# Patient Record
Sex: FEMALE | Race: ASIAN | NOT HISPANIC OR LATINO
[De-identification: names, ages, dates, MRNs, and addresses within clinical notes are randomized per-mention and may not be internally consistent; named-entity substitution may affect disease eponyms.]

---

## 2018-10-12 ENCOUNTER — TRANSCRIPTION ENCOUNTER (OUTPATIENT)
Age: 23
End: 2018-10-12

## 2019-01-18 ENCOUNTER — TRANSCRIPTION ENCOUNTER (OUTPATIENT)
Age: 24
End: 2019-01-18

## 2019-11-11 ENCOUNTER — EMERGENCY (EMERGENCY)
Facility: HOSPITAL | Age: 24
LOS: 1 days | Discharge: HOME | End: 2019-11-11
Attending: EMERGENCY MEDICINE
Payer: COMMERCIAL

## 2019-11-11 VITALS
TEMPERATURE: 98 F | SYSTOLIC BLOOD PRESSURE: 131 MMHG | HEIGHT: 66 IN | OXYGEN SATURATION: 100 % | HEART RATE: 105 BPM | WEIGHT: 139.99 LBS | DIASTOLIC BLOOD PRESSURE: 88 MMHG | RESPIRATION RATE: 16 BRPM

## 2019-11-11 PROCEDURE — 99053 MED SERV 10PM-8AM 24 HR FAC: CPT

## 2019-11-11 PROCEDURE — 99283 EMERGENCY DEPT VISIT LOW MDM: CPT | Mod: 25

## 2019-11-11 PROCEDURE — 73110 X-RAY EXAM OF WRIST: CPT | Mod: 26,RT

## 2019-11-11 PROCEDURE — 73110 X-RAY EXAM OF WRIST: CPT

## 2019-11-11 PROCEDURE — 99283 EMERGENCY DEPT VISIT LOW MDM: CPT

## 2019-11-11 RX ORDER — IBUPROFEN 200 MG
800 TABLET ORAL ONCE
Refills: 0 | Status: COMPLETED | OUTPATIENT
Start: 2019-11-11 | End: 2019-11-11

## 2019-11-11 RX ADMIN — Medication 800 MILLIGRAM(S): at 04:52
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
## 2019-11-11 NOTE — ED PROVIDER NOTE - PATIENT PORTAL LINK FT
You can access the FollowMyHealth Patient Portal offered by [facility] by registering at the following website: [email or web address]. By joining [employer]’s FollowMyHealth portal, you will also be able to view your health information using other applications (apps) compatible with our system.
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
## 2019-11-11 NOTE — ED PROVIDER NOTE - MUSCULOSKELETAL MINIMAL EXAM
Swelling of the R wrist, range of motion intact. No bony tenderness. Range of motion of hand intact. No anatomical snuff box tenderness. Peripheral pulses intact.

## 2019-12-09 ENCOUNTER — EMERGENCY (EMERGENCY)
Facility: HOSPITAL | Age: 24
LOS: 1 days | Discharge: HOME | End: 2019-12-09
Attending: EMERGENCY MEDICINE | Admitting: EMERGENCY MEDICINE
Payer: COMMERCIAL

## 2019-12-09 VITALS
SYSTOLIC BLOOD PRESSURE: 105 MMHG | RESPIRATION RATE: 16 BRPM | OXYGEN SATURATION: 99 % | HEART RATE: 85 BPM | DIASTOLIC BLOOD PRESSURE: 72 MMHG

## 2019-12-09 VITALS
OXYGEN SATURATION: 100 % | HEIGHT: 66 IN | TEMPERATURE: 100 F | HEART RATE: 127 BPM | SYSTOLIC BLOOD PRESSURE: 108 MMHG | WEIGHT: 139.99 LBS | RESPIRATION RATE: 20 BRPM | DIASTOLIC BLOOD PRESSURE: 69 MMHG

## 2019-12-09 LAB
ALBUMIN SERPL ELPH-MCNC: 3.7 G/DL — SIGNIFICANT CHANGE UP (ref 3.3–5)
ALP SERPL-CCNC: 46 U/L — SIGNIFICANT CHANGE UP (ref 40–120)
ALT FLD-CCNC: 16 U/L — SIGNIFICANT CHANGE UP (ref 12–78)
ANION GAP SERPL CALC-SCNC: 8 MMOL/L — SIGNIFICANT CHANGE UP (ref 5–17)
APTT BLD: 30.8 SEC — SIGNIFICANT CHANGE UP (ref 28.5–37)
AST SERPL-CCNC: 18 U/L — SIGNIFICANT CHANGE UP (ref 15–37)
BASOPHILS # BLD AUTO: 0.01 K/UL — SIGNIFICANT CHANGE UP (ref 0–0.2)
BASOPHILS NFR BLD AUTO: 0.2 % — SIGNIFICANT CHANGE UP (ref 0–2)
BILIRUB SERPL-MCNC: 0.6 MG/DL — SIGNIFICANT CHANGE UP (ref 0.2–1.2)
BUN SERPL-MCNC: 11 MG/DL — SIGNIFICANT CHANGE UP (ref 7–23)
CALCIUM SERPL-MCNC: 9 MG/DL — SIGNIFICANT CHANGE UP (ref 8.5–10.1)
CHLORIDE SERPL-SCNC: 108 MMOL/L — SIGNIFICANT CHANGE UP (ref 96–108)
CO2 SERPL-SCNC: 24 MMOL/L — SIGNIFICANT CHANGE UP (ref 22–31)
CREAT SERPL-MCNC: 0.82 MG/DL — SIGNIFICANT CHANGE UP (ref 0.5–1.3)
EOSINOPHIL # BLD AUTO: 0 K/UL — SIGNIFICANT CHANGE UP (ref 0–0.5)
EOSINOPHIL NFR BLD AUTO: 0 % — SIGNIFICANT CHANGE UP (ref 0–6)
GLUCOSE SERPL-MCNC: 102 MG/DL — HIGH (ref 70–99)
HCT VFR BLD CALC: 35.2 % — SIGNIFICANT CHANGE UP (ref 34.5–45)
HGB BLD-MCNC: 11.7 G/DL — SIGNIFICANT CHANGE UP (ref 11.5–15.5)
IMM GRANULOCYTES NFR BLD AUTO: 0.5 % — SIGNIFICANT CHANGE UP (ref 0–1.5)
INR BLD: 1.52 RATIO — HIGH (ref 0.88–1.16)
LACTATE SERPL-SCNC: 1.5 MMOL/L — SIGNIFICANT CHANGE UP (ref 0.7–2)
LYMPHOCYTES # BLD AUTO: 0.82 K/UL — LOW (ref 1–3.3)
LYMPHOCYTES # BLD AUTO: 14.3 % — SIGNIFICANT CHANGE UP (ref 13–44)
MCHC RBC-ENTMCNC: 26.3 PG — LOW (ref 27–34)
MCHC RBC-ENTMCNC: 33.2 GM/DL — SIGNIFICANT CHANGE UP (ref 32–36)
MCV RBC AUTO: 79.1 FL — LOW (ref 80–100)
MONOCYTES # BLD AUTO: 0.94 K/UL — HIGH (ref 0–0.9)
MONOCYTES NFR BLD AUTO: 16.4 % — HIGH (ref 2–14)
NEUTROPHILS # BLD AUTO: 3.93 K/UL — SIGNIFICANT CHANGE UP (ref 1.8–7.4)
NEUTROPHILS NFR BLD AUTO: 68.6 % — SIGNIFICANT CHANGE UP (ref 43–77)
NRBC # BLD: 0 /100 WBCS — SIGNIFICANT CHANGE UP (ref 0–0)
PLATELET # BLD AUTO: 206 K/UL — SIGNIFICANT CHANGE UP (ref 150–400)
POTASSIUM SERPL-MCNC: 3.3 MMOL/L — LOW (ref 3.5–5.3)
POTASSIUM SERPL-SCNC: 3.3 MMOL/L — LOW (ref 3.5–5.3)
PROT SERPL-MCNC: 7.9 G/DL — SIGNIFICANT CHANGE UP (ref 6–8.3)
PROTHROM AB SERPL-ACNC: 17.5 SEC — HIGH (ref 10–12.9)
RBC # BLD: 4.45 M/UL — SIGNIFICANT CHANGE UP (ref 3.8–5.2)
RBC # FLD: 13.2 % — SIGNIFICANT CHANGE UP (ref 10.3–14.5)
SODIUM SERPL-SCNC: 140 MMOL/L — SIGNIFICANT CHANGE UP (ref 135–145)
WBC # BLD: 5.73 K/UL — SIGNIFICANT CHANGE UP (ref 3.8–10.5)
WBC # FLD AUTO: 5.73 K/UL — SIGNIFICANT CHANGE UP (ref 3.8–10.5)

## 2019-12-09 PROCEDURE — 71046 X-RAY EXAM CHEST 2 VIEWS: CPT | Mod: 26

## 2019-12-09 PROCEDURE — 96374 THER/PROPH/DIAG INJ IV PUSH: CPT

## 2019-12-09 PROCEDURE — 71046 X-RAY EXAM CHEST 2 VIEWS: CPT

## 2019-12-09 PROCEDURE — 85730 THROMBOPLASTIN TIME PARTIAL: CPT

## 2019-12-09 PROCEDURE — 99284 EMERGENCY DEPT VISIT MOD MDM: CPT

## 2019-12-09 PROCEDURE — 96375 TX/PRO/DX INJ NEW DRUG ADDON: CPT

## 2019-12-09 PROCEDURE — 85610 PROTHROMBIN TIME: CPT

## 2019-12-09 PROCEDURE — 87040 BLOOD CULTURE FOR BACTERIA: CPT

## 2019-12-09 PROCEDURE — 80053 COMPREHEN METABOLIC PANEL: CPT

## 2019-12-09 PROCEDURE — 85027 COMPLETE CBC AUTOMATED: CPT

## 2019-12-09 PROCEDURE — 36415 COLL VENOUS BLD VENIPUNCTURE: CPT

## 2019-12-09 PROCEDURE — 96361 HYDRATE IV INFUSION ADD-ON: CPT

## 2019-12-09 PROCEDURE — 93010 ELECTROCARDIOGRAM REPORT: CPT

## 2019-12-09 PROCEDURE — 93005 ELECTROCARDIOGRAM TRACING: CPT

## 2019-12-09 PROCEDURE — 83605 ASSAY OF LACTIC ACID: CPT

## 2019-12-09 PROCEDURE — 99284 EMERGENCY DEPT VISIT MOD MDM: CPT | Mod: 25

## 2019-12-09 RX ORDER — KETOROLAC TROMETHAMINE 30 MG/ML
15 SYRINGE (ML) INJECTION ONCE
Refills: 0 | Status: DISCONTINUED | OUTPATIENT
Start: 2019-12-09 | End: 2019-12-09

## 2019-12-09 RX ORDER — ONDANSETRON 8 MG/1
4 TABLET, FILM COATED ORAL ONCE
Refills: 0 | Status: COMPLETED | OUTPATIENT
Start: 2019-12-09 | End: 2019-12-09

## 2019-12-09 RX ORDER — ONDANSETRON 8 MG/1
1 TABLET, FILM COATED ORAL
Qty: 15 | Refills: 0
Start: 2019-12-09 | End: 2019-12-13

## 2019-12-09 RX ORDER — SODIUM CHLORIDE 9 MG/ML
1950 INJECTION INTRAMUSCULAR; INTRAVENOUS; SUBCUTANEOUS ONCE
Refills: 0 | Status: COMPLETED | OUTPATIENT
Start: 2019-12-09 | End: 2019-12-09

## 2019-12-09 RX ORDER — ACETAMINOPHEN 500 MG
975 TABLET ORAL ONCE
Refills: 0 | Status: COMPLETED | OUTPATIENT
Start: 2019-12-09 | End: 2019-12-09

## 2019-12-09 RX ADMIN — SODIUM CHLORIDE 1950 MILLILITER(S): 9 INJECTION INTRAMUSCULAR; INTRAVENOUS; SUBCUTANEOUS at 10:12

## 2019-12-09 RX ADMIN — ONDANSETRON 4 MILLIGRAM(S): 8 TABLET, FILM COATED ORAL at 10:20

## 2019-12-09 RX ADMIN — SODIUM CHLORIDE 1950 MILLILITER(S): 9 INJECTION INTRAMUSCULAR; INTRAVENOUS; SUBCUTANEOUS at 11:12

## 2019-12-09 RX ADMIN — Medication 15 MILLIGRAM(S): at 10:20

## 2019-12-09 RX ADMIN — Medication 975 MILLIGRAM(S): at 10:18

## 2019-12-09 NOTE — ED PROVIDER NOTE - OBJECTIVE STATEMENT
pt with cough, congestion, fevers, chills for last 2-3 days, seen at urgent care yesterday and had negative strep test and neg flu test.  pt states that every few hours she spikes a fever, denies abdominal pain, denies diarrhea, denies dysuria, had a couple of episodes of vomiting.

## 2019-12-09 NOTE — ED PROVIDER NOTE - NSFOLLOWUPINSTRUCTIONS_ED_ALL_ED_FT
-- Follow up with your primary care physician in 48 hours.    -- Return to the ER for worsening or persistent symptoms, and/or ANY NEW OR CONCERNING SYMPTOMS.    -- If you have difficulty following up, please call: [phone] to obtain a [facility] doctor or specialist who takes your insurance in your area.

## 2019-12-09 NOTE — ED PROVIDER NOTE - PHYSICAL EXAMINATION
Gen:  alert, awake, no acute distress  HEENT:  atraumatic head, airway clear, pupils equal and round, nasal congestion  CV:  rrr, nl S1, S2, no m/r/g  Pulm:  lungs CTA b/l  Abd: s/nt/nd, +BS  Ext:  moving all extremities  Neuro:  grossly intact, no focal deficits  Skin:  clear, dry, intact  Psych: AOx3, normal affect, no apparent risk to self or others

## 2019-12-09 NOTE — ED PROVIDER NOTE - PATIENT PORTAL LINK FT
You can access the FollowMyHealth Patient Portal offered by [facility] by registering at the following website: [email or web address]. By joining [employer]’s FollowMyHealth portal, you will also be able to view your health information using other applications (apps) compatible with our system.

## 2019-12-14 LAB
CULTURE RESULTS: SIGNIFICANT CHANGE UP
CULTURE RESULTS: SIGNIFICANT CHANGE UP
SPECIMEN SOURCE: SIGNIFICANT CHANGE UP
SPECIMEN SOURCE: SIGNIFICANT CHANGE UP

## 2020-03-15 ENCOUNTER — TRANSCRIPTION ENCOUNTER (OUTPATIENT)
Age: 25
End: 2020-03-15

## 2020-04-20 ENCOUNTER — EMERGENCY (EMERGENCY)
Facility: HOSPITAL | Age: 25
LOS: 1 days | Discharge: HOME | End: 2020-04-20
Attending: STUDENT IN AN ORGANIZED HEALTH CARE EDUCATION/TRAINING PROGRAM
Payer: COMMERCIAL

## 2020-04-20 VITALS
DIASTOLIC BLOOD PRESSURE: 83 MMHG | SYSTOLIC BLOOD PRESSURE: 121 MMHG | TEMPERATURE: 98 F | WEIGHT: 130.07 LBS | HEART RATE: 95 BPM | RESPIRATION RATE: 16 BRPM | HEIGHT: 66 IN | OXYGEN SATURATION: 99 %

## 2020-04-20 PROCEDURE — 99283 EMERGENCY DEPT VISIT LOW MDM: CPT | Mod: 25

## 2020-04-20 RX ORDER — TETANUS TOXOID, REDUCED DIPHTHERIA TOXOID AND ACELLULAR PERTUSSIS VACCINE, ADSORBED 5; 2.5; 8; 8; 2.5 [IU]/.5ML; [IU]/.5ML; UG/.5ML; UG/.5ML; UG/.5ML
0.5 SUSPENSION INTRAMUSCULAR ONCE
Refills: 0 | Status: COMPLETED | OUTPATIENT
Start: 2020-04-20 | End: 2020-04-20

## 2020-04-20 RX ORDER — RALTEGRAVIR 400 MG/1
400 TABLET, FILM COATED ORAL ONCE
Refills: 0 | Status: COMPLETED | OUTPATIENT
Start: 2020-04-20 | End: 2020-04-20

## 2020-04-20 RX ORDER — EMTRICITABINE AND TENOFOVIR DISOPROXIL FUMARATE 200; 300 MG/1; MG/1
1 TABLET, FILM COATED ORAL ONCE
Refills: 0 | Status: COMPLETED | OUTPATIENT
Start: 2020-04-20 | End: 2020-04-20

## 2020-04-20 NOTE — ED ADULT TRIAGE NOTE - CHIEF COMPLAINT QUOTE
Pt was recapping the kineret syringe after administration and she got stuck with the needle on the left thumb

## 2020-04-20 NOTE — ED PROVIDER NOTE - PATIENT PORTAL LINK FT
You can access the FollowMyHealth Patient Portal offered by [facility] by registering at the following website: [email or web address]. By joining [employer]’s FollowMyHealth portal, you will also be able to view your health information using other applications (apps) compatible with our system.

## 2020-04-20 NOTE — ED PROVIDER NOTE - PROGRESS NOTE DETAILS
pt c/s about risk benefits of prep, understands risk is small however without hiv status of source pt I would recommend taking prep until information obtained, pt aware and a/w taking prep, will follow up with ID in 1 week, 7 day packet given

## 2020-04-20 NOTE — ED PROVIDER NOTE - OBJECTIVE STATEMENT
24f with no sig pmh p/w needle stick on r index finger. no cp/sob/n/v/d/dysuria/cough/cold. last tdap unknown.  pt cleaned and washed wound unstairs.  small needle-  kineret syringe. RN unsure of patient HIV status

## 2020-04-21 LAB
ALBUMIN SERPL ELPH-MCNC: 4 G/DL — SIGNIFICANT CHANGE UP (ref 3.5–5)
ALP SERPL-CCNC: 57 U/L — SIGNIFICANT CHANGE UP (ref 40–120)
ALT FLD-CCNC: 16 U/L DA — SIGNIFICANT CHANGE UP (ref 10–60)
ANION GAP SERPL CALC-SCNC: 8 MMOL/L — SIGNIFICANT CHANGE UP (ref 5–17)
AST SERPL-CCNC: 10 U/L — SIGNIFICANT CHANGE UP (ref 10–40)
BILIRUB SERPL-MCNC: 0.9 MG/DL — SIGNIFICANT CHANGE UP (ref 0.2–1.2)
BUN SERPL-MCNC: 11 MG/DL — SIGNIFICANT CHANGE UP (ref 7–18)
CALCIUM SERPL-MCNC: 9.1 MG/DL — SIGNIFICANT CHANGE UP (ref 8.4–10.5)
CHLORIDE SERPL-SCNC: 106 MMOL/L — SIGNIFICANT CHANGE UP (ref 96–108)
CO2 SERPL-SCNC: 24 MMOL/L — SIGNIFICANT CHANGE UP (ref 22–31)
CREAT SERPL-MCNC: 0.67 MG/DL — SIGNIFICANT CHANGE UP (ref 0.5–1.3)
GLUCOSE SERPL-MCNC: 95 MG/DL — SIGNIFICANT CHANGE UP (ref 70–99)
HAV IGM SER-ACNC: SIGNIFICANT CHANGE UP
HBV CORE IGM SER-ACNC: SIGNIFICANT CHANGE UP
HBV SURFACE AG SER-ACNC: SIGNIFICANT CHANGE UP
HCV AB S/CO SERPL IA: 0.19 S/CO — SIGNIFICANT CHANGE UP (ref 0–0.99)
HCV AB SERPL-IMP: SIGNIFICANT CHANGE UP
HIV 1+2 AB+HIV1 P24 AG SERPL QL IA: SIGNIFICANT CHANGE UP
POTASSIUM SERPL-MCNC: 4.1 MMOL/L — SIGNIFICANT CHANGE UP (ref 3.5–5.3)
POTASSIUM SERPL-SCNC: 4.1 MMOL/L — SIGNIFICANT CHANGE UP (ref 3.5–5.3)
PROT SERPL-MCNC: 8.4 G/DL — HIGH (ref 6–8.3)
SODIUM SERPL-SCNC: 138 MMOL/L — SIGNIFICANT CHANGE UP (ref 135–145)

## 2020-04-21 PROCEDURE — 86703 HIV-1/HIV-2 1 RESULT ANTBDY: CPT

## 2020-04-21 PROCEDURE — 80074 ACUTE HEPATITIS PANEL: CPT

## 2020-04-21 PROCEDURE — 99283 EMERGENCY DEPT VISIT LOW MDM: CPT | Mod: 25

## 2020-04-21 PROCEDURE — 36415 COLL VENOUS BLD VENIPUNCTURE: CPT

## 2020-04-21 PROCEDURE — 87389 HIV-1 AG W/HIV-1&-2 AB AG IA: CPT

## 2020-04-21 PROCEDURE — 80053 COMPREHEN METABOLIC PANEL: CPT

## 2020-04-21 PROCEDURE — 90715 TDAP VACCINE 7 YRS/> IM: CPT

## 2020-04-21 PROCEDURE — 90471 IMMUNIZATION ADMIN: CPT

## 2020-04-21 RX ADMIN — TETANUS TOXOID, REDUCED DIPHTHERIA TOXOID AND ACELLULAR PERTUSSIS VACCINE, ADSORBED 0.5 MILLILITER(S): 5; 2.5; 8; 8; 2.5 SUSPENSION INTRAMUSCULAR at 00:18

## 2020-04-26 ENCOUNTER — MESSAGE (OUTPATIENT)
Age: 25
End: 2020-04-26

## 2020-05-03 LAB
SARS-COV-2 IGG SERPL IA-ACNC: 2.1 AU/ML
SARS-COV-2 IGG SERPL QL IA: NEGATIVE

## 2020-05-05 ENCOUNTER — APPOINTMENT (OUTPATIENT)
Dept: DISASTER EMERGENCY | Facility: HOSPITAL | Age: 25
End: 2020-05-05

## 2020-10-12 ENCOUNTER — TRANSCRIPTION ENCOUNTER (OUTPATIENT)
Age: 25
End: 2020-10-12

## 2020-12-10 ENCOUNTER — TRANSCRIPTION ENCOUNTER (OUTPATIENT)
Age: 25
End: 2020-12-10

## 2020-12-20 ENCOUNTER — TRANSCRIPTION ENCOUNTER (OUTPATIENT)
Age: 25
End: 2020-12-20

## 2020-12-24 ENCOUNTER — OUTPATIENT (OUTPATIENT)
Dept: OUTPATIENT SERVICES | Facility: HOSPITAL | Age: 25
LOS: 1 days | End: 2020-12-24
Payer: COMMERCIAL

## 2020-12-24 DIAGNOSIS — Z00.00 ENCOUNTER FOR GENERAL ADULT MEDICAL EXAMINATION WITHOUT ABNORMAL FINDINGS: ICD-10-CM

## 2020-12-24 LAB — SARS-COV-2 RNA SPEC QL NAA+PROBE: SIGNIFICANT CHANGE UP

## 2020-12-24 PROCEDURE — 87635 SARS-COV-2 COVID-19 AMP PRB: CPT

## 2020-12-25 ENCOUNTER — TRANSCRIPTION ENCOUNTER (OUTPATIENT)
Age: 25
End: 2020-12-25

## 2021-01-17 ENCOUNTER — TRANSCRIPTION ENCOUNTER (OUTPATIENT)
Age: 26
End: 2021-01-17

## 2021-04-14 ENCOUNTER — TRANSCRIPTION ENCOUNTER (OUTPATIENT)
Age: 26
End: 2021-04-14

## 2021-04-23 PROBLEM — Z00.00 ENCOUNTER FOR PREVENTIVE HEALTH EXAMINATION: Status: ACTIVE | Noted: 2021-04-23

## 2021-05-03 ENCOUNTER — APPOINTMENT (OUTPATIENT)
Dept: OBGYN | Facility: CLINIC | Age: 26
End: 2021-05-03
Payer: COMMERCIAL

## 2021-05-03 ENCOUNTER — NON-APPOINTMENT (OUTPATIENT)
Age: 26
End: 2021-05-03

## 2021-05-03 PROCEDURE — 99395 PREV VISIT EST AGE 18-39: CPT

## 2021-05-03 PROCEDURE — 99072 ADDL SUPL MATRL&STAF TM PHE: CPT

## 2021-10-25 ENCOUNTER — TRANSCRIPTION ENCOUNTER (OUTPATIENT)
Age: 26
End: 2021-10-25

## 2021-11-05 ENCOUNTER — APPOINTMENT (OUTPATIENT)
Dept: OBGYN | Facility: CLINIC | Age: 26
End: 2021-11-05
Payer: COMMERCIAL

## 2021-11-05 VITALS — WEIGHT: 161 LBS | DIASTOLIC BLOOD PRESSURE: 74 MMHG | SYSTOLIC BLOOD PRESSURE: 128 MMHG

## 2021-11-05 DIAGNOSIS — L73.1 PSEUDOFOLLICULITIS BARBAE: ICD-10-CM

## 2021-11-05 DIAGNOSIS — Z78.9 OTHER SPECIFIED HEALTH STATUS: ICD-10-CM

## 2021-11-05 PROCEDURE — 99213 OFFICE O/P EST LOW 20 MIN: CPT

## 2021-11-05 RX ORDER — FLUCONAZOLE 150 MG/1
150 TABLET ORAL
Qty: 2 | Refills: 3 | Status: ACTIVE | COMMUNITY
Start: 2021-11-05 | End: 1900-01-01

## 2021-11-05 RX ORDER — CLINDAMYCIN HYDROCHLORIDE 300 MG/1
300 CAPSULE ORAL EVERY 6 HOURS
Qty: 28 | Refills: 0 | Status: ACTIVE | COMMUNITY
Start: 2021-11-05 | End: 1900-01-01

## 2021-11-05 RX ORDER — CHLORHEXIDINE GLUCONATE 213 G/1000ML
4 SOLUTION TOPICAL
Qty: 1 | Refills: 0 | Status: ACTIVE | COMMUNITY
Start: 2021-11-05 | End: 1900-01-01

## 2021-11-07 ENCOUNTER — TRANSCRIPTION ENCOUNTER (OUTPATIENT)
Age: 26
End: 2021-11-07

## 2021-11-08 NOTE — PHYSICAL EXAM
[Chaperone Present] : A chaperone was present in the examining room during all aspects of the physical examination [Appropriately responsive] : appropriately responsive [Alert] : alert [No Acute Distress] : no acute distress [Soft] : soft [Non-tender] : non-tender [Non-distended] : non-distended [No HSM] : No HSM [No Lesions] : no lesions [No Mass] : no mass [Oriented x3] : oriented x3 [Enlarged] : enlarged [Normal] : normal [FreeTextEntry1] : Rossi [FreeTextEntry2] : pt with ingrown hair above clitoris. Are of inflammation and tenderness noted

## 2021-11-08 NOTE — HISTORY OF PRESENT ILLNESS
[Currently Active] : currently active [Men] : men [No] : No [FreeTextEntry1] : 25 yo female presents today c/o an ingrown hair x 3 weeks.Pt was seen by a dermatologist a few weeks ago who prescribed her doxycycline and gave her a steroid injection. Pt feels ingrown has not improved much. [FreeTextEntry3] : none

## 2021-11-08 NOTE — PLAN
[FreeTextEntry1] : 35 yo female presents today c/o ingrown hair:\par -RX sent for clindamycin 300mg and hibiclens to clean the area \par - Advised patient to apply warm compresses to the area\par -Pt traveling to [facility] on Monday for two weeks, pt concerned about getting a yeast infection from all the antibiotics she has been on. RX sent to pharmacy for diflucan.\par -F/u PRN

## 2022-01-03 ENCOUNTER — OUTPATIENT (OUTPATIENT)
Dept: OUTPATIENT SERVICES | Facility: HOSPITAL | Age: 27
LOS: 1 days | End: 2022-01-03
Payer: COMMERCIAL

## 2022-01-03 DIAGNOSIS — Z20.828 CONTACT WITH AND (SUSPECTED) EXPOSURE TO OTHER VIRAL COMMUNICABLE DISEASES: ICD-10-CM

## 2022-01-03 PROCEDURE — U0005: CPT

## 2022-01-03 PROCEDURE — U0003: CPT

## 2022-01-11 ENCOUNTER — OUTPATIENT (OUTPATIENT)
Dept: OUTPATIENT SERVICES | Facility: HOSPITAL | Age: 27
LOS: 1 days | End: 2022-01-11
Payer: COMMERCIAL

## 2022-01-11 DIAGNOSIS — Z00.00 ENCOUNTER FOR GENERAL ADULT MEDICAL EXAMINATION WITHOUT ABNORMAL FINDINGS: ICD-10-CM

## 2022-01-11 LAB — SARS-COV-2 RNA SPEC QL NAA+PROBE: DETECTED

## 2022-01-11 PROCEDURE — 87635 SARS-COV-2 COVID-19 AMP PRB: CPT

## 2022-03-14 NOTE — ED PROVIDER NOTE - PSYCHIATRIC, MLM
Alert and oriented to person, place, time/situation. normal mood and affect. no apparent risk to self or others. I will SWITCH the dose or number of times a day I take the medications listed below when I get home from the hospital:  None Adult

## 2022-03-21 ENCOUNTER — APPOINTMENT (OUTPATIENT)
Dept: OBGYN | Facility: CLINIC | Age: 27
End: 2022-03-21

## 2022-05-31 ENCOUNTER — NON-APPOINTMENT (OUTPATIENT)
Age: 27
End: 2022-05-31

## 2022-06-05 ENCOUNTER — NON-APPOINTMENT (OUTPATIENT)
Age: 27
End: 2022-06-05

## 2022-06-12 ENCOUNTER — EMERGENCY (EMERGENCY)
Facility: HOSPITAL | Age: 27
LOS: 1 days | Discharge: HOME | End: 2022-06-12
Attending: EMERGENCY MEDICINE
Payer: COMMERCIAL

## 2022-06-12 VITALS
DIASTOLIC BLOOD PRESSURE: 82 MMHG | HEIGHT: 66 IN | TEMPERATURE: 97 F | SYSTOLIC BLOOD PRESSURE: 129 MMHG | OXYGEN SATURATION: 98 % | WEIGHT: 160.06 LBS | RESPIRATION RATE: 16 BRPM | HEART RATE: 87 BPM

## 2022-06-12 LAB — HCG UR QL: NEGATIVE — SIGNIFICANT CHANGE UP

## 2022-06-12 PROCEDURE — 73070 X-RAY EXAM OF ELBOW: CPT | Mod: 26,RT

## 2022-06-12 PROCEDURE — 99283 EMERGENCY DEPT VISIT LOW MDM: CPT

## 2022-06-12 PROCEDURE — 99283 EMERGENCY DEPT VISIT LOW MDM: CPT | Mod: 25

## 2022-06-12 PROCEDURE — 73070 X-RAY EXAM OF ELBOW: CPT

## 2022-06-12 PROCEDURE — 81025 URINE PREGNANCY TEST: CPT

## 2022-06-12 RX ORDER — IBUPROFEN 200 MG
600 TABLET ORAL ONCE
Refills: 0 | Status: COMPLETED | OUTPATIENT
Start: 2022-06-12 | End: 2022-06-12

## 2022-06-12 RX ORDER — ACETAMINOPHEN 500 MG
650 TABLET ORAL ONCE
Refills: 0 | Status: COMPLETED | OUTPATIENT
Start: 2022-06-12 | End: 2022-06-12

## 2022-06-12 RX ORDER — IBUPROFEN 200 MG
1 TABLET ORAL
Qty: 30 | Refills: 0
Start: 2022-06-12

## 2022-06-12 RX ADMIN — Medication 650 MILLIGRAM(S): at 14:00

## 2022-06-12 RX ADMIN — Medication 600 MILLIGRAM(S): at 15:15

## 2022-06-12 RX ADMIN — Medication 650 MILLIGRAM(S): at 14:30

## 2022-06-12 RX ADMIN — Medication 600 MILLIGRAM(S): at 14:45

## 2022-06-12 NOTE — ED PROVIDER NOTE - PATIENT PORTAL LINK FT
You can access the FollowMyHealth Patient Portal offered by [facility] by registering at the following website: [email or web address]. By joining [employer]’s FollowMyHealth portal, you will also be able to view your health information using other applications (apps) compatible with our system.

## 2022-06-12 NOTE — ED PROVIDER NOTE - NSFOLLOWUPINSTRUCTIONS_ED_ALL_ED_FT
Elbow Sprain    Front view of the elbow, with a close-up of the elbow joint and ligaments.   An elbow sprain is an injury to one of the strong bands of tissue (ligaments) that connect the bones of the elbow. Ligaments connect the three bones that make up the elbow joint. This injury usually occurs on the inside of the elbow and rarely on the outside. There are three types of elbow sprains:  •A grade 1 sprain is a stretching of a ligament. This injury causes minor pain and swelling, but the joint remains stable.      •A grade 2 sprain is a partial ligament tear. This injury may cause moderate pain and swelling, and a looseness in the joint that causes it to move more than normal (laxity).      •A grade 3 sprain is a complete ligament tear. This injury will cause severe pain and swelling, and the joint will become unstable.        What are the causes?    This condition may be caused by:  •A sudden injury (acute sprain). This may result from falling on an outstretched arm or severely twisting or straining your elbow joint.      •An overuse injury. This injury occurs gradually over time from doing activities that involve the same elbow movements over and over again. This type of injury is common in activities that require overhand throwing.        What are the signs or symptoms?    Symptoms of this condition include:  •Pain.      •Pain that gets worse with elbow movement.      •Swelling.      •Bruising.      •Stiffness of the elbow joint.      •Laxity of the elbow joint.      •Inability to use the elbow joint.      •Tingling or numbness.      •Hearing a pop or feeling a tear at the time of the injury.        How is this diagnosed?    This condition may be diagnosed based on:  •Your symptoms and history of an injury or activity that puts stress on the elbow.      •A physical exam. The health care provider will check the movement and stability of your elbow.      •Imaging tests, such as an X-ray or MRI. These tests can show how severe the sprain is and can be used to rule out a broken bone or stress fracture.        How is this treated?    At first, this condition may be treated by protecting, resting, icing, applying pressure (compression), and raising (elevating) the injured elbow above the level of your heart. This is known as PRICE therapy.    Additional treatment depends on the severity of the sprain.  •A grade 1 sprain may need only PRICE therapy.      •A grade 2 sprain may be treated with PRICE therapy and an elbow brace.      •A grade 3 sprain usually requires surgery to repair the ligament.      Your health care provider may also recommend taking a nonsteroidal anti-inflammatory drug (NSAID) to reduce pain and swelling.    You may also need to do certain exercises to maintain full movement and to strengthen the muscles of the shoulder, forearm, and elbow (physical therapy).      Follow these instructions at home:    If you have a brace:     •Wear the brace as told by your health care provider. Remove it only as told by your health care provider.      •Loosen the brace if your fingers tingle, become numb, or turn cold and blue.      •Keep the brace clean.    •If the brace is not waterproof:  •Do not let it get wet.      •Cover it with a watertight covering when you take a bath or shower.          Managing pain, stiffness, and swelling   Bag of ice on a towel on the skin. •If directed, put ice on your elbow:  •Put ice in a plastic bag.      •Place a towel between your skin and the bag.      •Leave the ice on for 20 minutes, 2–3 times a day.        •Move your fingers often to reduce stiffness and swelling.      •Elevate your elbow above the level of your heart while you are sitting or lying down.      Activity     •Avoid activities that cause elbow pain.       •Return to your normal activities as told by your health care provider. Ask your health care provider what activities are safe for you.      •Ask your health care provider when it is safe to drive if you have an elbow brace.      •Do exercises as told by your health care provider.      General instructions     •Wear an elastic bandage or compression wrap only as told by your health care provider.      •Take over-the-counter and prescription medicines only as told by your health care provider.      • Do not use any products that contain nicotine or tobacco, such as cigarettes, e-cigarettes, and chewing tobacco. These can delay healing. If you need help quitting, ask your health care provider.      •Keep all follow-up visits as told by your health care provider. This is important.        Contact a health care provider if:    •Your symptoms get worse.      •You develop new symptoms.      •Your symptoms have not improved with home care after two weeks.        Get help right away if:    •You have severe pain.      •Your fingers turn white, very red, or cold and blue.        Summary    •An elbow sprain is an injury to a ligament in the elbow.      •An elbow sprain can be from an acute injury or repetitive stress.      •Symptoms include pain, swelling, loss of movement, and bruising.      •At first, this condition may be treated by protecting, resting, icing, applying pressure (compression), and elevating the injured elbow above the level of your heart. This is known as PRICE therapy.       •Other treatments depend on the severity of the sprain.      This information is not intended to replace advice given to you by your health care provider. Make sure you discuss any questions you have with your health care provider.

## 2022-06-12 NOTE — ED PROVIDER NOTE - OBJECTIVE STATEMENT
26 year old female with no significant past medical history presents to the ED with complaints of right-sided arm pain particularly in the elbow area today. 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 She reports that most of her weight landed on her right-sided arm, now causing pain. The patient is additionally reporting that her period is 2 days late and that she is unsure if she is pregnant. NKDA.

## 2022-06-12 NOTE — ED ADULT NURSE NOTE - OBJECTIVE STATEMENT
c/o of right arm pain s/p falling off from the rolling chair. Pt stated she hit her coccyx and tried to break the fall with her right arm. Denies hitting her head and LOC

## 2022-06-12 NOTE — ED PROVIDER NOTE - MUSCULOSKELETAL, MLM
Mild tenderness to palpation at the radial head. Full range of motion of the elbow. Minimal right wrist tenderness, no swelling or deformity. Right shoulder mild tenderness to palpation, full range of motion.

## 2022-06-12 NOTE — ED ADULT NURSE NOTE - NSFALLRSKASSESSTYPE_ED_ALL_ED
1/4/18  DME   - NO PRECERT REQUIRED AS WE ARE PAR PROVIDERS, UNDER $750 AND CODE DOES NOT REQUIRE AUTHORIZATION - PER MICA @ [facility]
Initial (On Arrival)

## 2022-06-23 ENCOUNTER — APPOINTMENT (OUTPATIENT)
Dept: ORTHOPEDIC SURGERY | Facility: CLINIC | Age: 27
End: 2022-06-23

## 2022-07-20 ENCOUNTER — APPOINTMENT (OUTPATIENT)
Dept: ORTHOPEDIC SURGERY | Facility: CLINIC | Age: 27
End: 2022-07-20

## 2022-08-12 ENCOUNTER — INPATIENT (INPATIENT)
Facility: HOSPITAL | Age: 27
LOS: 1 days | Discharge: HOME | DRG: 371 | End: 2022-08-14
Attending: INTERNAL MEDICINE | Admitting: INTERNAL MEDICINE
Payer: COMMERCIAL

## 2022-08-12 ENCOUNTER — EMERGENCY (EMERGENCY)
Facility: HOSPITAL | Age: 27
LOS: 1 days | End: 2022-08-12
Attending: EMERGENCY MEDICINE
Payer: COMMERCIAL

## 2022-08-12 VITALS
HEIGHT: 66 IN | SYSTOLIC BLOOD PRESSURE: 106 MMHG | HEART RATE: 101 BPM | OXYGEN SATURATION: 98 % | TEMPERATURE: 98 F | WEIGHT: 160.06 LBS | RESPIRATION RATE: 18 BRPM | DIASTOLIC BLOOD PRESSURE: 73 MMHG

## 2022-08-12 VITALS
OXYGEN SATURATION: 100 % | SYSTOLIC BLOOD PRESSURE: 108 MMHG | HEART RATE: 71 BPM | DIASTOLIC BLOOD PRESSURE: 71 MMHG | RESPIRATION RATE: 16 BRPM | TEMPERATURE: 98 F

## 2022-08-12 VITALS
TEMPERATURE: 102 F | SYSTOLIC BLOOD PRESSURE: 106 MMHG | DIASTOLIC BLOOD PRESSURE: 72 MMHG | OXYGEN SATURATION: 100 % | HEART RATE: 110 BPM | WEIGHT: 164.91 LBS | RESPIRATION RATE: 18 BRPM | HEIGHT: 66 IN

## 2022-08-12 DIAGNOSIS — K62.89 OTHER SPECIFIED DISEASES OF ANUS AND RECTUM: ICD-10-CM

## 2022-08-12 DIAGNOSIS — E87.6 HYPOKALEMIA: ICD-10-CM

## 2022-08-12 DIAGNOSIS — Z29.9 ENCOUNTER FOR PROPHYLACTIC MEASURES, UNSPECIFIED: ICD-10-CM

## 2022-08-12 LAB
ALBUMIN SERPL ELPH-MCNC: 3.7 G/DL — SIGNIFICANT CHANGE UP (ref 3.3–5)
ALBUMIN SERPL ELPH-MCNC: 3.7 G/DL — SIGNIFICANT CHANGE UP (ref 3.3–5)
ALP SERPL-CCNC: 60 U/L — SIGNIFICANT CHANGE UP (ref 40–120)
ALP SERPL-CCNC: 60 U/L — SIGNIFICANT CHANGE UP (ref 40–120)
ALT FLD-CCNC: 17 U/L — SIGNIFICANT CHANGE UP (ref 12–78)
ALT FLD-CCNC: 19 U/L — SIGNIFICANT CHANGE UP (ref 12–78)
ANION GAP SERPL CALC-SCNC: 7 MMOL/L — SIGNIFICANT CHANGE UP (ref 5–17)
ANION GAP SERPL CALC-SCNC: 8 MMOL/L — SIGNIFICANT CHANGE UP (ref 5–17)
APPEARANCE UR: CLEAR — SIGNIFICANT CHANGE UP
AST SERPL-CCNC: 14 U/L — LOW (ref 15–37)
AST SERPL-CCNC: 16 U/L — SIGNIFICANT CHANGE UP (ref 15–37)
BASOPHILS # BLD AUTO: 0.03 K/UL — SIGNIFICANT CHANGE UP (ref 0–0.2)
BASOPHILS # BLD AUTO: 0.03 K/UL — SIGNIFICANT CHANGE UP (ref 0–0.2)
BASOPHILS NFR BLD AUTO: 0.4 % — SIGNIFICANT CHANGE UP (ref 0–2)
BASOPHILS NFR BLD AUTO: 0.4 % — SIGNIFICANT CHANGE UP (ref 0–2)
BILIRUB SERPL-MCNC: 0.9 MG/DL — SIGNIFICANT CHANGE UP (ref 0.2–1.2)
BILIRUB SERPL-MCNC: 1 MG/DL — SIGNIFICANT CHANGE UP (ref 0.2–1.2)
BILIRUB UR-MCNC: ABNORMAL
BUN SERPL-MCNC: 12 MG/DL — SIGNIFICANT CHANGE UP (ref 7–23)
BUN SERPL-MCNC: 12 MG/DL — SIGNIFICANT CHANGE UP (ref 7–23)
CALCIUM SERPL-MCNC: 8.9 MG/DL — SIGNIFICANT CHANGE UP (ref 8.5–10.1)
CALCIUM SERPL-MCNC: 9.3 MG/DL — SIGNIFICANT CHANGE UP (ref 8.5–10.1)
CHLORIDE SERPL-SCNC: 109 MMOL/L — HIGH (ref 96–108)
CHLORIDE SERPL-SCNC: 109 MMOL/L — HIGH (ref 96–108)
CK MB CFR SERPL CALC: <1 NG/ML — SIGNIFICANT CHANGE UP (ref 0–3.6)
CO2 SERPL-SCNC: 22 MMOL/L — SIGNIFICANT CHANGE UP (ref 22–31)
CO2 SERPL-SCNC: 23 MMOL/L — SIGNIFICANT CHANGE UP (ref 22–31)
COLOR SPEC: YELLOW — SIGNIFICANT CHANGE UP
CREAT SERPL-MCNC: 0.66 MG/DL — SIGNIFICANT CHANGE UP (ref 0.5–1.3)
CREAT SERPL-MCNC: 0.67 MG/DL — SIGNIFICANT CHANGE UP (ref 0.5–1.3)
DIFF PNL FLD: NEGATIVE — SIGNIFICANT CHANGE UP
EGFR: 123 ML/MIN/1.73M2 — SIGNIFICANT CHANGE UP
EGFR: 123 ML/MIN/1.73M2 — SIGNIFICANT CHANGE UP
EOSINOPHIL # BLD AUTO: 0 K/UL — SIGNIFICANT CHANGE UP (ref 0–0.5)
EOSINOPHIL # BLD AUTO: 0.01 K/UL — SIGNIFICANT CHANGE UP (ref 0–0.5)
EOSINOPHIL NFR BLD AUTO: 0 % — SIGNIFICANT CHANGE UP (ref 0–6)
EOSINOPHIL NFR BLD AUTO: 0.1 % — SIGNIFICANT CHANGE UP (ref 0–6)
FLUAV AG NPH QL: SIGNIFICANT CHANGE UP
FLUBV AG NPH QL: SIGNIFICANT CHANGE UP
GLUCOSE SERPL-MCNC: 100 MG/DL — HIGH (ref 70–99)
GLUCOSE SERPL-MCNC: 104 MG/DL — HIGH (ref 70–99)
GLUCOSE UR QL: NEGATIVE — SIGNIFICANT CHANGE UP
HCG SERPL-ACNC: <1 MIU/ML — SIGNIFICANT CHANGE UP
HCG SERPL-ACNC: <1 MIU/ML — SIGNIFICANT CHANGE UP
HCT VFR BLD CALC: 35.4 % — SIGNIFICANT CHANGE UP (ref 34.5–45)
HCT VFR BLD CALC: 35.5 % — SIGNIFICANT CHANGE UP (ref 34.5–45)
HGB BLD-MCNC: 11.8 G/DL — SIGNIFICANT CHANGE UP (ref 11.5–15.5)
HGB BLD-MCNC: 11.8 G/DL — SIGNIFICANT CHANGE UP (ref 11.5–15.5)
IMM GRANULOCYTES NFR BLD AUTO: 0.1 % — SIGNIFICANT CHANGE UP (ref 0–1.5)
IMM GRANULOCYTES NFR BLD AUTO: 0.4 % — SIGNIFICANT CHANGE UP (ref 0–1.5)
KETONES UR-MCNC: ABNORMAL
LACTATE SERPL-SCNC: 1.3 MMOL/L — SIGNIFICANT CHANGE UP (ref 0.7–2)
LEUKOCYTE ESTERASE UR-ACNC: ABNORMAL
LIDOCAIN IGE QN: 143 U/L — SIGNIFICANT CHANGE UP (ref 73–393)
LIDOCAIN IGE QN: 145 U/L — SIGNIFICANT CHANGE UP (ref 73–393)
LYMPHOCYTES # BLD AUTO: 0.81 K/UL — LOW (ref 1–3.3)
LYMPHOCYTES # BLD AUTO: 0.83 K/UL — LOW (ref 1–3.3)
LYMPHOCYTES # BLD AUTO: 10.2 % — LOW (ref 13–44)
LYMPHOCYTES # BLD AUTO: 10.8 % — LOW (ref 13–44)
MAGNESIUM SERPL-MCNC: 1.9 MG/DL — SIGNIFICANT CHANGE UP (ref 1.6–2.6)
MCHC RBC-ENTMCNC: 25.5 PG — LOW (ref 27–34)
MCHC RBC-ENTMCNC: 25.5 PG — LOW (ref 27–34)
MCHC RBC-ENTMCNC: 33.2 GM/DL — SIGNIFICANT CHANGE UP (ref 32–36)
MCHC RBC-ENTMCNC: 33.3 GM/DL — SIGNIFICANT CHANGE UP (ref 32–36)
MCV RBC AUTO: 76.6 FL — LOW (ref 80–100)
MCV RBC AUTO: 76.7 FL — LOW (ref 80–100)
MONOCYTES # BLD AUTO: 0.44 K/UL — SIGNIFICANT CHANGE UP (ref 0–0.9)
MONOCYTES # BLD AUTO: 0.48 K/UL — SIGNIFICANT CHANGE UP (ref 0–0.9)
MONOCYTES NFR BLD AUTO: 5.7 % — SIGNIFICANT CHANGE UP (ref 2–14)
MONOCYTES NFR BLD AUTO: 6.1 % — SIGNIFICANT CHANGE UP (ref 2–14)
NEUTROPHILS # BLD AUTO: 6.34 K/UL — SIGNIFICANT CHANGE UP (ref 1.8–7.4)
NEUTROPHILS # BLD AUTO: 6.59 K/UL — SIGNIFICANT CHANGE UP (ref 1.8–7.4)
NEUTROPHILS NFR BLD AUTO: 82.7 % — HIGH (ref 43–77)
NEUTROPHILS NFR BLD AUTO: 83.1 % — HIGH (ref 43–77)
NITRITE UR-MCNC: NEGATIVE — SIGNIFICANT CHANGE UP
NRBC # BLD: 0 /100 WBCS — SIGNIFICANT CHANGE UP (ref 0–0)
NRBC # BLD: 0 /100 WBCS — SIGNIFICANT CHANGE UP (ref 0–0)
PCP SPEC-MCNC: SIGNIFICANT CHANGE UP
PH UR: 6.5 — SIGNIFICANT CHANGE UP (ref 5–8)
PLATELET # BLD AUTO: 275 K/UL — SIGNIFICANT CHANGE UP (ref 150–400)
PLATELET # BLD AUTO: 296 K/UL — SIGNIFICANT CHANGE UP (ref 150–400)
POTASSIUM SERPL-MCNC: 3.4 MMOL/L — LOW (ref 3.5–5.3)
POTASSIUM SERPL-MCNC: 3.4 MMOL/L — LOW (ref 3.5–5.3)
POTASSIUM SERPL-SCNC: 3.4 MMOL/L — LOW (ref 3.5–5.3)
POTASSIUM SERPL-SCNC: 3.4 MMOL/L — LOW (ref 3.5–5.3)
PROT SERPL-MCNC: 8 G/DL — SIGNIFICANT CHANGE UP (ref 6–8.3)
PROT SERPL-MCNC: 8.2 G/DL — SIGNIFICANT CHANGE UP (ref 6–8.3)
PROT UR-MCNC: 15
RBC # BLD: 4.62 M/UL — SIGNIFICANT CHANGE UP (ref 3.8–5.2)
RBC # BLD: 4.63 M/UL — SIGNIFICANT CHANGE UP (ref 3.8–5.2)
RBC # FLD: 13.3 % — SIGNIFICANT CHANGE UP (ref 10.3–14.5)
RBC # FLD: 13.4 % — SIGNIFICANT CHANGE UP (ref 10.3–14.5)
RSV RNA NPH QL NAA+NON-PROBE: SIGNIFICANT CHANGE UP
SARS-COV-2 RNA SPEC QL NAA+PROBE: DETECTED
SODIUM SERPL-SCNC: 139 MMOL/L — SIGNIFICANT CHANGE UP (ref 135–145)
SODIUM SERPL-SCNC: 139 MMOL/L — SIGNIFICANT CHANGE UP (ref 135–145)
SP GR SPEC: 1.01 — SIGNIFICANT CHANGE UP (ref 1.01–1.02)
TROPONIN I, HIGH SENSITIVITY RESULT: <3 NG/L — SIGNIFICANT CHANGE UP
UROBILINOGEN FLD QL: NEGATIVE — SIGNIFICANT CHANGE UP
WBC # BLD: 7.67 K/UL — SIGNIFICANT CHANGE UP (ref 3.8–10.5)
WBC # BLD: 7.93 K/UL — SIGNIFICANT CHANGE UP (ref 3.8–10.5)
WBC # FLD AUTO: 7.67 K/UL — SIGNIFICANT CHANGE UP (ref 3.8–10.5)
WBC # FLD AUTO: 7.93 K/UL — SIGNIFICANT CHANGE UP (ref 3.8–10.5)

## 2022-08-12 PROCEDURE — 84702 CHORIONIC GONADOTROPIN TEST: CPT

## 2022-08-12 PROCEDURE — 76705 ECHO EXAM OF ABDOMEN: CPT

## 2022-08-12 PROCEDURE — 84484 ASSAY OF TROPONIN QUANT: CPT

## 2022-08-12 PROCEDURE — 99223 1ST HOSP IP/OBS HIGH 75: CPT | Mod: GC

## 2022-08-12 PROCEDURE — 96375 TX/PRO/DX INJ NEW DRUG ADDON: CPT

## 2022-08-12 PROCEDURE — 93010 ELECTROCARDIOGRAM REPORT: CPT

## 2022-08-12 PROCEDURE — 36415 COLL VENOUS BLD VENIPUNCTURE: CPT

## 2022-08-12 PROCEDURE — 99285 EMERGENCY DEPT VISIT HI MDM: CPT | Mod: 25

## 2022-08-12 PROCEDURE — 82553 CREATINE MB FRACTION: CPT

## 2022-08-12 PROCEDURE — 80053 COMPREHEN METABOLIC PANEL: CPT

## 2022-08-12 PROCEDURE — 99285 EMERGENCY DEPT VISIT HI MDM: CPT

## 2022-08-12 PROCEDURE — 74176 CT ABD & PELVIS W/O CONTRAST: CPT | Mod: 26,59,MA

## 2022-08-12 PROCEDURE — 71045 X-RAY EXAM CHEST 1 VIEW: CPT | Mod: 26

## 2022-08-12 PROCEDURE — 85025 COMPLETE CBC W/AUTO DIFF WBC: CPT

## 2022-08-12 PROCEDURE — 74018 RADEX ABDOMEN 1 VIEW: CPT | Mod: 26

## 2022-08-12 PROCEDURE — 74177 CT ABD & PELVIS W/CONTRAST: CPT | Mod: 26,MA

## 2022-08-12 PROCEDURE — 93005 ELECTROCARDIOGRAM TRACING: CPT

## 2022-08-12 PROCEDURE — 83690 ASSAY OF LIPASE: CPT

## 2022-08-12 PROCEDURE — 96374 THER/PROPH/DIAG INJ IV PUSH: CPT | Mod: XU

## 2022-08-12 PROCEDURE — 74177 CT ABD & PELVIS W/CONTRAST: CPT | Mod: MA

## 2022-08-12 PROCEDURE — 83735 ASSAY OF MAGNESIUM: CPT

## 2022-08-12 PROCEDURE — 76705 ECHO EXAM OF ABDOMEN: CPT | Mod: 26

## 2022-08-12 RX ORDER — HYDROMORPHONE HYDROCHLORIDE 2 MG/ML
1 INJECTION INTRAMUSCULAR; INTRAVENOUS; SUBCUTANEOUS ONCE
Refills: 0 | Status: DISCONTINUED | OUTPATIENT
Start: 2022-08-12 | End: 2022-08-12

## 2022-08-12 RX ORDER — SODIUM CHLORIDE 9 MG/ML
1000 INJECTION INTRAMUSCULAR; INTRAVENOUS; SUBCUTANEOUS ONCE
Refills: 0 | Status: COMPLETED | OUTPATIENT
Start: 2022-08-12 | End: 2022-08-12

## 2022-08-12 RX ORDER — ONDANSETRON 8 MG/1
4 TABLET, FILM COATED ORAL ONCE
Refills: 0 | Status: COMPLETED | OUTPATIENT
Start: 2022-08-12 | End: 2022-08-12

## 2022-08-12 RX ORDER — ONDANSETRON 8 MG/1
4 TABLET, FILM COATED ORAL EVERY 8 HOURS
Refills: 0 | Status: DISCONTINUED | OUTPATIENT
Start: 2022-08-12 | End: 2022-08-14

## 2022-08-12 RX ORDER — PANTOPRAZOLE SODIUM 20 MG/1
40 TABLET, DELAYED RELEASE ORAL ONCE
Refills: 0 | Status: COMPLETED | OUTPATIENT
Start: 2022-08-12 | End: 2022-08-12

## 2022-08-12 RX ORDER — ACETAMINOPHEN 500 MG
650 TABLET ORAL EVERY 6 HOURS
Refills: 0 | Status: DISCONTINUED | OUTPATIENT
Start: 2022-08-12 | End: 2022-08-13

## 2022-08-12 RX ORDER — ACETAMINOPHEN 500 MG
1000 TABLET ORAL ONCE
Refills: 0 | Status: COMPLETED | OUTPATIENT
Start: 2022-08-12 | End: 2022-08-12

## 2022-08-12 RX ORDER — KETOROLAC TROMETHAMINE 30 MG/ML
15 SYRINGE (ML) INJECTION ONCE
Refills: 0 | Status: DISCONTINUED | OUTPATIENT
Start: 2022-08-12 | End: 2022-08-12

## 2022-08-12 RX ORDER — KETOROLAC TROMETHAMINE 30 MG/ML
30 SYRINGE (ML) INJECTION ONCE
Refills: 0 | Status: DISCONTINUED | OUTPATIENT
Start: 2022-08-12 | End: 2022-08-12

## 2022-08-12 RX ORDER — PANTOPRAZOLE SODIUM 20 MG/1
1 TABLET, DELAYED RELEASE ORAL
Qty: 30 | Refills: 0
Start: 2022-08-12 | End: 2022-09-10

## 2022-08-12 RX ORDER — FAMOTIDINE 10 MG/ML
20 INJECTION INTRAVENOUS ONCE
Refills: 0 | Status: COMPLETED | OUTPATIENT
Start: 2022-08-12 | End: 2022-08-12

## 2022-08-12 RX ORDER — LANOLIN ALCOHOL/MO/W.PET/CERES
5 CREAM (GRAM) TOPICAL AT BEDTIME
Refills: 0 | Status: DISCONTINUED | OUTPATIENT
Start: 2022-08-12 | End: 2022-08-14

## 2022-08-12 RX ORDER — POTASSIUM CHLORIDE 20 MEQ
10 PACKET (EA) ORAL
Refills: 0 | Status: COMPLETED | OUTPATIENT
Start: 2022-08-12 | End: 2022-08-12

## 2022-08-12 RX ORDER — ONDANSETRON 8 MG/1
1 TABLET, FILM COATED ORAL
Qty: 15 | Refills: 0
Start: 2022-08-12 | End: 2022-08-16

## 2022-08-12 RX ADMIN — ONDANSETRON 4 MILLIGRAM(S): 8 TABLET, FILM COATED ORAL at 13:34

## 2022-08-12 RX ADMIN — Medication 400 MILLIGRAM(S): at 01:12

## 2022-08-12 RX ADMIN — Medication 30 MILLIGRAM(S): at 17:35

## 2022-08-12 RX ADMIN — Medication 1000 MILLIGRAM(S): at 01:27

## 2022-08-12 RX ADMIN — ONDANSETRON 4 MILLIGRAM(S): 8 TABLET, FILM COATED ORAL at 20:11

## 2022-08-12 RX ADMIN — SODIUM CHLORIDE 1000 MILLILITER(S): 9 INJECTION INTRAMUSCULAR; INTRAVENOUS; SUBCUTANEOUS at 01:11

## 2022-08-12 RX ADMIN — SODIUM CHLORIDE 1000 MILLILITER(S): 9 INJECTION INTRAMUSCULAR; INTRAVENOUS; SUBCUTANEOUS at 13:33

## 2022-08-12 RX ADMIN — FAMOTIDINE 20 MILLIGRAM(S): 10 INJECTION INTRAVENOUS at 13:34

## 2022-08-12 RX ADMIN — SODIUM CHLORIDE 1000 MILLILITER(S): 9 INJECTION INTRAMUSCULAR; INTRAVENOUS; SUBCUTANEOUS at 15:46

## 2022-08-12 RX ADMIN — SODIUM CHLORIDE 1000 MILLILITER(S): 9 INJECTION INTRAMUSCULAR; INTRAVENOUS; SUBCUTANEOUS at 02:11

## 2022-08-12 RX ADMIN — Medication 1000 MILLIGRAM(S): at 20:40

## 2022-08-12 RX ADMIN — Medication 400 MILLIGRAM(S): at 20:11

## 2022-08-12 RX ADMIN — Medication 400 MILLIGRAM(S): at 13:33

## 2022-08-12 RX ADMIN — SODIUM CHLORIDE 1000 MILLILITER(S): 9 INJECTION INTRAMUSCULAR; INTRAVENOUS; SUBCUTANEOUS at 19:29

## 2022-08-12 RX ADMIN — ONDANSETRON 4 MILLIGRAM(S): 8 TABLET, FILM COATED ORAL at 01:12

## 2022-08-12 RX ADMIN — Medication 15 MILLIGRAM(S): at 03:18

## 2022-08-12 RX ADMIN — Medication 1000 MILLIGRAM(S): at 15:46

## 2022-08-12 RX ADMIN — Medication 100 MILLIEQUIVALENT(S): at 21:46

## 2022-08-12 RX ADMIN — Medication 15 MILLIGRAM(S): at 03:33

## 2022-08-12 RX ADMIN — HYDROMORPHONE HYDROCHLORIDE 1 MILLIGRAM(S): 2 INJECTION INTRAMUSCULAR; INTRAVENOUS; SUBCUTANEOUS at 23:36

## 2022-08-12 RX ADMIN — SODIUM CHLORIDE 1000 MILLILITER(S): 9 INJECTION INTRAMUSCULAR; INTRAVENOUS; SUBCUTANEOUS at 17:35

## 2022-08-12 RX ADMIN — PANTOPRAZOLE SODIUM 40 MILLIGRAM(S): 20 TABLET, DELAYED RELEASE ORAL at 01:11

## 2022-08-12 RX ADMIN — Medication 30 MILLILITER(S): at 23:16

## 2022-08-12 RX ADMIN — Medication 5 MILLIGRAM(S): at 23:16

## 2022-08-12 RX ADMIN — Medication 30 MILLIGRAM(S): at 19:29

## 2022-08-12 RX ADMIN — Medication 1000 MILLIGRAM(S): at 15:47

## 2022-08-12 RX ADMIN — Medication 100 MILLIEQUIVALENT(S): at 19:24

## 2022-08-12 NOTE — CHART NOTE - NSCHARTNOTEFT_GEN_A_CORE
Called by RN for patient complaining of abdominal pain and insomnia.  Patient seen and examined at bedside.      T(C): 37.1 (08-12-22 @ 21:12), Max: 38.8 (08-12-22 @ 12:04)  HR: 105 (08-12-22 @ 21:54) (71 - 110)  BP: 122/66 (08-12-22 @ 21:54) (102/62 - 122/66)  RR: 16 (08-12-22 @ 21:54) (16 - 18)  SpO2: 100% (08-12-22 @ 21:54) (98% - 100%)  Wt(kg): --    Physical Exam: ******  Gen: well appearing, NAD  HEENT: NCAT, PEERLA b/l, EOMI b/l, no conjunctival erythema  Cardio: regular rate and rhythm, +s1s2, no murmurs, rubs, or gallops  Pulm: CTA b/l, no wheezes, rales or rhonchi  Abdomen: soft, nontender, nondistended, +BS x4 quadrants, no guarding  Extremities: no clubbing, cyanosis or edema, +2 pedal pulses  Neuro: AAOx3, moving all 4 extremities, sensation intact  Skin: warm and dry    Assessment/Plan *****  26yo Female with no PMH presenting with abdominal pain, n/v/d since the past 3-4 days, now admitted for proctitis.  Called to evaluate abdominal pain.    Abdominal Pain- received toradol, ofirmev  Give dilaudid 1mg IVP now  RN to call with any changes Called by RN for patient complaining of abdominal pain and insomnia.  Patient seen and examined at bedside.      T(C): 37.1 (08-12-22 @ 21:12), Max: 38.8 (08-12-22 @ 12:04)  HR: 105 (08-12-22 @ 21:54) (71 - 110)  BP: 122/66 (08-12-22 @ 21:54) (102/62 - 122/66)  RR: 16 (08-12-22 @ 21:54) (16 - 18)  SpO2: 100% (08-12-22 @ 21:54) (98% - 100%)  Wt(kg): --    Physical Exam: ******  Gen: well appearing, NAD  HEENT: NCAT, PEERLA b/l, EOMI b/l, no conjunctival erythema  Cardio: regular rate and rhythm, +s1s2, no murmurs, rubs, or gallops  Pulm: CTA b/l, no wheezes, rales or rhonchi  Abdomen: soft, nontender, nondistended, +BS x4 quadrants, no guarding  Extremities: no clubbing, cyanosis or edema, +2 pedal pulses  Neuro: AAOx3, moving all 4 extremities, sensation intact  Skin: warm and dry    Assessment/Plan *****  28yo Female with no PMH presenting with abdominal pain, n/v/d since the past 3-4 days, now admitted for proctitis.  Called to evaluate abdominal pain.    Abdominal Pain- received toradol, ofirmev  Give   RN to call with any changes Called by RN for patient complaining of abdominal pain and insomnia.  Patient seen and examined at bedside. Patient says she feels like she's having gas pains throughout her entire abdomen. Patient is due to start her next menstrual cycle tomorrow. Also complaining of nausea, vomiting and diarrhea with loose yellow stools. Denies chest pain, sob.      Vital Signs Last 24 Hrs  T(C): 37.1 (12 Aug 2022 21:12), Max: 38.8 (12 Aug 2022 12:04)  T(F): 98.7 (12 Aug 2022 21:12), Max: 101.8 (12 Aug 2022 12:04)  HR: 105 (12 Aug 2022 21:54) (71 - 110)  BP: 122/66 (12 Aug 2022 21:54) (102/62 - 122/66)  BP(mean): --  RR: 16 (12 Aug 2022 21:54) (16 - 18)  SpO2: 100% (12 Aug 2022 21:54) (98% - 100%)    Parameters below as of 12 Aug 2022 12:04  Patient On (Oxygen Delivery Method): room air        Physical Exam:   Gen: anxious appearing, NAD  HEENT: NCAT, EOMI b/l, no conjunctival erythema  Cardio: regular rate and rhythm, +s1s2, no murmurs, rubs, or gallops  Pulm: CTA b/l, no wheezes, rales or rhonchi  Abdomen: soft, mildly tender, mildly distended, +BS x4 quadrants, no guarding  Extremities: no clubbing, cyanosis or edema, +2 pedal pulses  Neuro: AAOx3, moving all 4 extremities, sensation intact  Skin: warm and dry    Assessment/Plan   26yo Female with no PMH presenting with abdominal pain, n/v/d since the past 3-4 days, now admitted for proctitis.  Called to evaluate abdominal pain.    Abdominal Pain- received toradol, ofirmev  -Abdominal X ray showed no evidence of stool but +gaseous distension  -Give maalox now    Insomnia  -Melatonin 5mg    RN to call with any changes Called by RN for patient complaining of abdominal pain and insomnia.  Patient seen and examined at bedside. Patient says she feels like she's having gas pains throughout her entire abdomen. Patient is due to start her next menstrual cycle tomorrow. Also complaining of nausea, vomiting and diarrhea with loose yellow stools. Denies chest pain, sob.      Vital Signs Last 24 Hrs  T(C): 37.1 (12 Aug 2022 21:12), Max: 38.8 (12 Aug 2022 12:04)  T(F): 98.7 (12 Aug 2022 21:12), Max: 101.8 (12 Aug 2022 12:04)  HR: 105 (12 Aug 2022 21:54) (71 - 110)  BP: 122/66 (12 Aug 2022 21:54) (102/62 - 122/66)  BP(mean): --  RR: 16 (12 Aug 2022 21:54) (16 - 18)  SpO2: 100% (12 Aug 2022 21:54) (98% - 100%)    Parameters below as of 12 Aug 2022 12:04  Patient On (Oxygen Delivery Method): room air        Physical Exam:   Gen: anxious appearing, NAD  HEENT: NCAT, EOMI b/l, no conjunctival erythema  Cardio: regular rate and rhythm, +s1s2, no murmurs, rubs, or gallops  Pulm: CTA b/l, no wheezes, rales or rhonchi  Abdomen: soft, mildly tender, mildly distended, +BS x4 quadrants, no guarding  Extremities: no clubbing, cyanosis or edema, +2 pedal pulses  Neuro: AAOx3, moving all 4 extremities, sensation intact  Skin: warm and dry    Assessment/Plan   26yo Female with no PMH presenting with abdominal pain, n/v/d since the past 3-4 days, now admitted for proctitis.  Called to evaluate abdominal pain.    Abdominal Pain- received toradol, ofirmev  -Abdominal X ray showed no evidence of stool but +gaseous distension  -Give maalox now    Insomnia  -Melatonin 5mg    RN to call with any changes      ADDENDUM [id number]  Called by RN for patient continuing to complain of abdominal pain s/p MAALOX  -Give Dilaudid 1mg IVP now Called by RN for patient complaining of abdominal pain and insomnia.  Patient seen and examined at bedside. Patient says she feels like she's having gas pains throughout her entire abdomen. Patient is due to start her next menstrual cycle tomorrow. Also complaining of nausea, vomiting and diarrhea with loose yellow stools. Denies chest pain, sob.      Vital Signs Last 24 Hrs  T(C): 37.1 (12 Aug 2022 21:12), Max: 38.8 (12 Aug 2022 12:04)  T(F): 98.7 (12 Aug 2022 21:12), Max: 101.8 (12 Aug 2022 12:04)  HR: 105 (12 Aug 2022 21:54) (71 - 110)  BP: 122/66 (12 Aug 2022 21:54) (102/62 - 122/66)  BP(mean): --  RR: 16 (12 Aug 2022 21:54) (16 - 18)  SpO2: 100% (12 Aug 2022 21:54) (98% - 100%)    Parameters below as of 12 Aug 2022 12:04  Patient On (Oxygen Delivery Method): room air        Physical Exam:   Gen: anxious appearing, NAD  HEENT: NCAT, EOMI b/l, no conjunctival erythema  Cardio: regular rate and rhythm, +s1s2, no murmurs, rubs, or gallops  Pulm: CTA b/l, no wheezes, rales or rhonchi  Abdomen: soft, mildly tender, mildly distended, +BS x4 quadrants, no guarding  Extremities: no clubbing, cyanosis or edema, +2 pedal pulses  Neuro: AAOx3, moving all 4 extremities, sensation intact  Skin: warm and dry    Assessment/Plan   26yo Female with no PMH presenting with abdominal pain, n/v/d since the past 3-4 days, now admitted for proctitis.  Called to evaluate abdominal pain.    Abdominal Pain- received toradol, ofirmev  -Abdominal X ray showed no evidence of stool but +gaseous distension  -Give maalox now    Insomnia  -Melatonin 5mg    RN to call with any changes      ADDENDUM [id number]  Called by RN for patient continuing to complain of abdominal pain s/p MAALOX  -Give Dilaudid 1mg IVP now    ADDENDUM 0108  Called by RN for patient refusing 3rd dose of potassium, requesting continuous fluids as she is unable to tolerate clear liquid diet, and requesting medication for nausea.  -D/c potassium, f/u AM BMP  -Started on NS 115cc/hr   -Give reglan now for nausea  - RN to call with any changes Called by RN for patient complaining of abdominal pain and insomnia.  Patient seen and examined at bedside. Patient says she feels like she's having gas pains throughout her entire abdomen. Patient is due to start her next menstrual cycle tomorrow. Also complaining of nausea, vomiting and diarrhea with loose yellow stools. Denies chest pain, sob.      Vital Signs Last 24 Hrs  T(C): 37.1 (12 Aug 2022 21:12), Max: 38.8 (12 Aug 2022 12:04)  T(F): 98.7 (12 Aug 2022 21:12), Max: 101.8 (12 Aug 2022 12:04)  HR: 105 (12 Aug 2022 21:54) (71 - 110)  BP: 122/66 (12 Aug 2022 21:54) (102/62 - 122/66)  BP(mean): --  RR: 16 (12 Aug 2022 21:54) (16 - 18)  SpO2: 100% (12 Aug 2022 21:54) (98% - 100%)    Parameters below as of 12 Aug 2022 12:04  Patient On (Oxygen Delivery Method): room air        Physical Exam:   Gen: anxious appearing, NAD  HEENT: NCAT, EOMI b/l, no conjunctival erythema  Cardio: regular rate and rhythm, +s1s2, no murmurs, rubs, or gallops  Pulm: CTA b/l, no wheezes, rales or rhonchi  Abdomen: soft, mildly tender, mildly distended, +BS x4 quadrants, no guarding  Extremities: no clubbing, cyanosis or edema, +2 pedal pulses  Neuro: AAOx3, moving all 4 extremities, sensation intact  Skin: warm and dry    Assessment/Plan   28yo Female with no PMH presenting with abdominal pain, n/v/d since the past 3-4 days, now admitted for proctitis.  Called to evaluate abdominal pain.    Abdominal Pain- received toradol, ofirmev  -Abdominal X ray showed no evidence of stool but +gaseous distension  -Give maalox now    Insomnia  -Melatonin 5mg    RN to call with any changes    ADDENDUM [id number]  Called by RN for patient continuing to complain of abdominal pain s/p MAALOX  -Give Dilaudid 1mg IVP now    ADDENDUM 0108  Called by RN for patient refusing 3rd dose of potassium, requesting continuous fluids as she is unable to tolerate clear liquid diet, and requesting medication for nausea.  -D/c potassium, f/u AM BMP  -Started on NS 115cc/hr   -Give reglan now for nausea  - RN to call with any changes    ADDENDUM 0531  Called by RN for patient complaining of persisting pain and fever of 100.8F. Patient seen and examined at bedside. Patient says she feels slightly better but is still experiencing pain and would like something stronger than tylenol. Denies sob, chest, nausea, hx of bleeds.  Give 650mg tylenol now for fever  Give 15mg Toradol IVP abdominal pain

## 2022-08-12 NOTE — ED ADULT NURSE NOTE - CAS DISCH CONDITION
External Cooling Fan Speed: 0 Laser Type: Nd:YAG 1064nm Treatment Number: 1 Post-Care Instructions: I reviewed with the patient in detail post-care instructions. Patient should avoid sun for a minimum of 4 weeks before and after treatment. Cooling: DCD setting Price (Use Numbers Only, No Special Characters Or $): 300.00 Pulse Duration: 10 ms Consent: Written consent obtained, risks reviewed including but not limited to crusting, scabbing, blistering, scarring, darker or lighter pigmentary change, paradoxical hair regrowth, incomplete removal of hair and infection. Detail Level: Detailed Endpoint: Immediate endpoint: perifollicular erythema and edema. Vaseline and ice applied. Post care reviewed with patient. Fluence: 20 Spot Size: 10 mm Stable

## 2022-08-12 NOTE — ED PROVIDER NOTE - CLINICAL SUMMARY MEDICAL DECISION MAKING FREE TEXT BOX
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 but she developed a fever has more pain and is still unable to tolerate anything.  bib mom for eval. mom states last time mom had similar sx she was pregnant  on eval  wd wn female awake alert no distress  heent nc at mmm perrla eomi sclera anicteric op clear nog f r   neck supple   cor tachy no m chest cta  abd soft but pt describes pain "all over" mostly on lower abd midline and rlq no cvat no scars  ext normal  neuro normal  plan labs gi consult consider rpt imaging due to occurrence of fever and worsening of sx

## 2022-08-12 NOTE — H&P ADULT - NSHPREVIEWOFSYSTEMS_GEN_ALL_CORE
Constitutional: denies fever, chills, sweating  HEENT: denies headache, dizziness, or lightheadedness  Respiratory: denies SOB, cough, or wheezing  Cardiovascular: denies CP, palpitations  Gastrointestinal: denies nausea, vomiting, diarrhea, constipation, abdominal pain, or bloody stools  Genitourinary: denies painful urination, increased frequency, urgency, or bloody urine  Skin/Breast: denies rashes or itching  Musculoskeletal: denies muscle aches, joint swelling, or muscle weakness  Neurologic: denies loss of sensation, numbness, or tingling  ROS negative except as noted above Constitutional: denies fever, chills, sweating  HEENT: denies headache, dizziness, or lightheadedness  Respiratory: denies SOB, cough, or wheezing  Cardiovascular: denies CP, palpitations  Neurologic: Lightheadedness when trying to walk  ROS negative except as noted above

## 2022-08-12 NOTE — ED ADULT NURSE NOTE - OBJECTIVE STATEMENT
Pt received in bed alert and oriented and very anxious with the c/o diffuse abd pain nausea and fever. Pt was here as a pt yesterday with the same complaint. As per Md's orders IV godwin placed blood specimen obtained and sent to the lab. Pt stable and nursing care ongoing and safety maintained. Nursing care ongoing and safety maintained.

## 2022-08-12 NOTE — ED PROVIDER NOTE - CARE PROVIDER_API CALL
Julio Gonzales ()  Internal Medicine  [address] [place] [address]  Phone: [phone]  Fax: [phone]  Follow Up Time:

## 2022-08-12 NOTE — H&P ADULT - PROBLEM SELECTOR PLAN 2
SCDs only, ambulating Likely secondary to GI losses, vomiting, and diarrhea, the latter of which has now stopped  -K 3.4 on admission labs, given ongoing losses, repleted with 40mEq x 1  -Repeat BMP with AM labs

## 2022-08-12 NOTE — ED ADULT NURSE NOTE - OBJECTIVE STATEMENT
Patient alert and oriented X 3. Complaining of abdominal pain, nausea vomiting ,diarrhea. Denies chest pain, shortness of breath, , headache, dizziness and fever. Lungs clears bilaterally. Respirations even and not labored. Abdomen soft non tender.

## 2022-08-12 NOTE — ED PROVIDER NOTE - PROGRESS NOTE DETAILS
Patient feeling much improved, labs, CT scan, reviewed aware of findings on CAT scan, recommend follow-up with GI, given the name of Dr. May

## 2022-08-12 NOTE — ED PROVIDER NOTE - PATIENT PORTAL LINK FT
You can access the FollowMyHealth Patient Portal offered by [facility] by registering at the following website: [email or web address]. By joining [employer]’s FollowMyHealth portal, you will also be able to view your health information using other applications (apps) compatible with our system.

## 2022-08-12 NOTE — H&P ADULT - HISTORY OF PRESENT ILLNESS
Patient is a 27y F no significant PMHx. Patient was seen in the ED last night with nausea/vomiting and abdominal pain since the past 3-4 days. Patient was also found to be febrile to 101F and was experiencing worsening abdominal symptoms. CT scan of abdomen revealed acute proctitis 8/11. Patient states her symptoms worsened overnight which prompted her to come back to the ER. *************       ED Course:   Vitals: T101.8F, , / 72, RR 18, SPO2 100%   Labs: K 3.4,   EKG: Normal sinus HR 87,   Imaging:  CT A/P: Suspected mild rectal wall thickening and possible mild perirectal stranding, suspicious for proctitis. Appendix minimally distended with fluid. No periappendiceal inflammation.  CXR: no acute findings  RUQ US: Normal right upper quadrant abdominal ultrasound.  Given in the ED: 2L NS, Ofirmev, Pepcid, Toradol, Zofran  Patient is a 27y F PMHx of GERD. Patient was seen in the ED last night with nausea/vomiting and abdominal pain since the past 3-4 days. Patient was also found to be febrile to 101F and was experiencing worsening abdominal symptoms. CT scan of abdomen revealed acute proctitis 8/11. Patient states her symptoms worsened overnight which prompted her to come back to the ER. She states her symptoms occurred spontaneously 4 days ago. Her abdominal pain is diffuse, vomiting and diarrhea lack blood or any ground coffee like substances. She had difficulty holding down and any food or water w/o vomiting. SShe came back to the hospital today because her abdominal pain was still persistent. She vomited 3 times today, but diarrhea has stopped yesterday. Her last menstrual cycle was on July 14th, they're usually regular cycles, one per month. She does occasionally experience bloating and cramps during menstruation and she is due for another this soon. She denies any prior abdominal surgeries. Otherwise, experiences no symptoms.       ED Course:   Vitals: T101.8F, , / 72, RR 18, SPO2 100%   Labs: K 3.4,   EKG: Normal sinus HR 87,   Imaging:  CT A/P: Suspected mild rectal wall thickening and possible mild perirectal stranding, suspicious for proctitis. Appendix minimally distended with fluid. No periappendiceal inflammation.  CXR: no acute findings  RUQ US: Normal right upper quadrant abdominal ultrasound.  Given in the ED: 2L NS, Ofirmev, Pepcid, Toradol, Zofran   Currently tolerating jello  Patient is a 27y F PMHx of GERD. Patient was seen in the ED last night with nausea/vomiting and abdominal pain since the past 3-4 days. Patient was also found to be febrile to 101F and was experiencing worsening abdominal symptoms. CT scan of abdomen revealed acute proctitis 8/11. Patient states her symptoms worsened overnight which prompted her to come back to the ER. She states her symptoms occurred spontaneously 4 days ago. Her abdominal pain is diffuse, vomiting and diarrhea lack blood or any ground coffee like substances. She had difficulty holding down and any food or water w/o vomiting. SShe came back to the hospital today because her abdominal pain was still persistent. She vomited 3 times today, but diarrhea has stopped yesterday. Her last menstrual cycle was on July 14th, they're usually regular cycles, one per month. She does occasionally experience bloating and cramps during menstruation and she is due for another this soon. She denies any prior abdominal surgeries. Otherwise, experiences no symptoms.       ED Course:   Vitals: T101.8F, , / 72, RR 18, SPO2 100%   Labs: K 3.4, repleted  EKG: Normal sinus HR 87,   Imaging:  CT A/P: Suspected mild rectal wall thickening and possible mild perirectal stranding, suspicious for proctitis. Appendix minimally distended with fluid. No periappendiceal inflammation.  CXR: no acute findings  RUQ US: Normal right upper quadrant abdominal ultrasound.  Given in the ED: 2L NS, Ofirmev, Pepcid, Toradol, Zofran   Currently tolerating jello

## 2022-08-12 NOTE — ED PROVIDER NOTE - PROGRESS NOTE DETAILS
Spoke to GI, Dr. Ellington who advised to medicine. Spoke to hospitalist, Dr. Pelaez who accepted admission.

## 2022-08-12 NOTE — PATIENT PROFILE ADULT - FALL HARM RISK - RISK INTERVENTIONS

## 2022-08-12 NOTE — H&P ADULT - NSHPPHYSICALEXAM_GEN_ALL_CORE
T(C): 37.8 (08-12-22 @ 14:41), Max: 38.8 (08-12-22 @ 12:04)  HR: 96 (08-12-22 @ 14:41) (71 - 110)  BP: 109/80 (08-12-22 @ 14:41) (106/72 - 109/80)  RR: 16 (08-12-22 @ 14:41) (16 - 18)  SpO2: 100% (08-12-22 @ 14:41) (98% - 100%)    Physical Exam:  Gen: well appearing, NAD  HEENT: NCAT, PEERLA b/l, EOMI b/l, no conjunctival erythema  Cardio: regular rate and rhythm, +s1s2, no murmurs, rubs, or gallops  Pulm: CTA b/l, no wheezes, rales or rhonchi  Abdomen: soft, nontender, nondistended, +BS x4 quadrants, no guarding  Extremities: no clubbing, cyanosis or edema, +2 pedal pulses  Neuro: AAOx3, CNII-XII intact grossly, 5/5 strength all extremities, sensation intact  Skin: warm and dry T(C): 37.8 (08-12-22 @ 14:41), Max: 38.8 (08-12-22 @ 12:04)  HR: 96 (08-12-22 @ 14:41) (71 - 110)  BP: 109/80 (08-12-22 @ 14:41) (106/72 - 109/80)  RR: 16 (08-12-22 @ 14:41) (16 - 18)  SpO2: 100% (08-12-22 @ 14:41) (98% - 100%)    Physical Exam:  Gen: well appearing, NAD  HEENT: NCAT, PEERLA b/l, EOMI b/l, no conjunctival erythema  Cardio: regular rate and rhythm, +s1s2, no murmurs, rubs, or gallops  Pulm: CTA b/l, no wheezes, rales or rhonchi  Abdomen: hyperactive bowel sounds. TTP diffusely around abodmen, most notably in the epigastric, RUQ, RLQ.  Extremities: no clubbing, cyanosis or edema, +2 pedal pulses  Skin: warm and dry

## 2022-08-12 NOTE — ED ADULT NURSE NOTE - CHIEF COMPLAINT QUOTE
Patient is a 26yo female complaining of fever and nausea and vomiting abdominal pain was just discharged today was not able to  prescriptions diarrhea.

## 2022-08-12 NOTE — H&P ADULT - ASSESSMENT
Patient is a 28 yo female with no PMHx presenting with abdominal pain, v/v/d since the past 3-4 days, now admitted for proctitis.  Patient is a 26 yo female with no PMHx presenting with abdominal pain, n/v/d since the past 3-4 days, now admitted for proctitis.

## 2022-08-12 NOTE — ED ADULT TRIAGE NOTE - CHIEF COMPLAINT QUOTE
Patient is a 28yo female complaining of fever and nausea and vomiting abdominal pain was just discharged today was not able to  prescriptions diarrhea.

## 2022-08-12 NOTE — CONSULT NOTE ADULT - SUBJECTIVE AND OBJECTIVE BOX
[facility]  Pravin Argueta PA-C  [address] [place] [id number] [phone]      Chief Complaint:  Patient is a 27y old  Female who presents with a chief complaint of nausea/vomiting    HPI:  Pt is a 27y F no significant PMHx. Reported to ED for nausea/vomiting and abdominal pain. She was in [place] ED earlier today for same symptoms, reported back to ED because of development of fever (reportedly 101F) and worsening symptoms. Reports these symptoms began about 3-4 days ago. Also had diarrhea that is now resolving. No recent sick contacts or travel. No recent abx, reports she did not eat anything out of the ordinary or anything undercooked. Denies further GI complaints.    Allergies:  No Known Allergies      Medications:      PMHX/PSHX:  No pertinent past medical history    No pertinent past medical history    No significant past surgical history    No significant past surgical history      ROS:     General:  No wt loss, fevers, chills, night sweats, fatigue,   Eyes:  Good vision, no reported pain  ENT:  No sore throat, pain, runny nose, dysphagia  CV:  No pain, palpitations, hypo/hypertension  Resp:  No dyspnea, cough, tachypnea, wheezing  GI:  +pain, +nausea, +vomiting, No diarrhea, No constipation, No weight loss, No fever, No pruritis, No rectal bleeding, No tarry stools, No dysphagia  :  No pain, bleeding, incontinence, nocturia  Muscle:  No pain, weakness  Neuro:  No weakness, tingling, memory problems  Psych:  No fatigue, insomnia, mood problems, depression  Endocrine:  No polyuria, polydipsia, cold/heat intolerance  Heme:  No petechiae, ecchymosis, easy bruisability  Skin:  No rash, tattoos, scars, edema      PHYSICAL EXAM:   Vital Signs:  Vital Signs Last 24 Hrs  T(C): 38.8 (12 Aug 2022 12:04), Max: 38.8 (12 Aug 2022 12:04)  T(F): 101.8 (12 Aug 2022 12:04), Max: 101.8 (12 Aug 2022 12:04)  HR: 110 (12 Aug 2022 12:04) (71 - 110)  BP: 106/72 (12 Aug 2022 12:04) (106/72 - 108/71)  BP(mean): --  RR: 18 (12 Aug 2022 12:04) (16 - 18)  SpO2: 100% (12 Aug 2022 12:04) (98% - 100%)    Parameters below as of 12 Aug 2022 12:04  Patient On (Oxygen Delivery Method): room air      Daily Height in cm: 167.64 (12 Aug 2022 12:04)    Daily     GENERAL:  Appears stated age  ABDOMEN:  Soft, +TTP epigastric region, non-distended  NEURO:  Alert    LABS:                        11.8   7.67  )-----------( 275      ( 12 Aug 2022 13:10 )             35.4     08-12    139  |  109<H>  |  12  ----------------------------<  104<H>  3.4<L>   |  22  |  0.67    Ca    8.9      12 Aug 2022 13:10  Mg     1.9     08-12    TPro  8.2  /  Alb  3.7  /  TBili  0.9  /  DBili  x   /  AST  16  /  ALT  17  /  AlkPhos  60  08-12    LIVER FUNCTIONS - ( 12 Aug 2022 13:10 )  Alb: 3.7 g/dL / Pro: 8.2 g/dL / ALK PHOS: 60 U/L / ALT: 17 U/L / AST: 16 U/L / GGT: x               Amylase Serum--      Lipase [id number]       Ammonia--  Amylase Serum--      Lipase [id number]     Imaging:      < from: CT Abdomen and Pelvis w/ IV Cont (08.12.22 @ 02:55) >    ACC: [id number] EXAM:  CT ABDOMEN AND PELVIS IC                          PROCEDURE DATE:  08/12/2022          INTERPRETATION:  CLINICAL INFORMATION: Abdominal pain, nausea, vomiting   and diarrhea.    COMPARISON: None.    CONTRAST/COMPLICATIONS:  IV Contrast: Omnipaque 350  90 cc administered   10 cc discarded  Oral Contrast: NONE  Complications: None reported at time of study completion    PROCEDURE:  CT of the Abdomen and Pelvis was performed.  Sagittal and coronal reformats were performed.    FINDINGS:  LOWER CHEST: Within normal limits.    LIVER: Within normal limits.  BILE DUCTS: Normal caliber.  GALLBLADDER: Within normal limits.  SPLEEN: Within normal limits.  PANCREAS: Within normal limits.  ADRENALS: Within normal limits.  KIDNEYS/URETERS: Within normal limits.    BLADDER: Within normal limits.  REPRODUCTIVE ORGANS: Uterus and adnexa within normal limits.    BOWEL: No bowel obstruction. Appendix is minimally distended to 7 mm with   fluid. Fluid also noted in the right colon and distal ileum. No   periappendiceal inflammation.  Rectum suboptimally evaluated due to complete underdistention but appears   mildly thick-walled. Possible mild perirectal stranding.  PERITONEUM: No ascites.  VESSELS: Within normal limits.  RETROPERITONEUM/LYMPH NODES: No lymphadenopathy.  ABDOMINAL WALL: Within normal limits.  BONES: Within normal limits.    IMPRESSION:  Suspected mild rectal wall thickening and possible mild perirectal   stranding, suspicious for proctitis.    Appendix minimallydistended with fluid. No periappendiceal inflammation.        --- End of Report ---            JESÚS LYNN MD; Attending Radiologist  This document has been electronically signed. Aug 12 2022  4:04AM    < end of copied text >

## 2022-08-12 NOTE — ED PROVIDER NOTE - CLINICAL SUMMARY MEDICAL DECISION MAKING FREE TEXT BOX
27-year-old female with abdominal pain, follow-up CT scan abdomen and pelvis, ultrasound abdomen, sent labs CBC, comprehensive, lipase, IV fluids, pain control, reevaluation.

## 2022-08-12 NOTE — H&P ADULT - PROBLEM SELECTOR PLAN 1
Patient presenting with abdominal pain, n/v/d since past 3-4 days   - On admission febrile to Tmax 101.8F, , WBC wnl   - CT A/P: Suspected mild rectal wall thickening and possible mild perirectal stranding, suspicious for proctitis. Appendix minimally distended with fluid. No periappendiceal inflammation.  CXR: no acute findings  RUQ US: Normal right upper quadrant abdominal ultrasound.  - Given in the ED 2L NS, Ofirmev, Pepcid, Toradol, Zofran   - Blood Cx ordered, f/u results   - Ordered STD testing- HIV, G&C, Syphilis, and hepatitis panel   - GI Dr. Ellington following, reccs arline   - Planned for repeat CT, f/u  - GI PCR and urine tox ordered- f/u reccs   - Continue with Zofran 4mg q6hs PRN for nausea   - clear liquid diet, advance as tolerated   - Pain control with PO Tylenol 650mg q6 PRN for mild pain

## 2022-08-12 NOTE — ED PROVIDER NOTE - NSFOLLOWUPINSTRUCTIONS_ED_ALL_ED_FT
WHAT YOU NEED TO KNOW:    A clear liquid diet is made up of clear liquids and foods that are liquid at room temperature. The clear liquid diet provides liquids, sugar, salt, and some nutrients until you can eat solid food. The clear liquid diet does not provide all the nutrients, vitamins, minerals, or calories that your body needs. Your healthcare provider will tell you when you can start to eat regular foods.     DISCHARGE INSTRUCTIONS:    Liquids and foods to include:   •Clear juices (such as apple, cranberry, or grape), strained citrus juices or fruit punch      •Coffee or tea (without cream or milk)      •Water      •Clear sports drinks or soft drinks, such as ginger ale, lemon-lime soda, or club soda (no cola or root beer)      •Clear broth, bouillon, or consommé      •Plain popsicles (no popsicles with pureed fruit or fiber)      •Hard candy      •Flavored gelatin without fruit      Liquids and foods to avoid:   •All solid foods, such as bread, pasta, fruit, vegetables, dairy, and protein foods      •Beverages containing alcohol      •Dairy products, such as milk, hot cocoa, buttermilk, and cream      •Fruit smoothies, nectars, fruit juices with pulp, and prune juice      •Tomato and vegetable juices      •Soups that contain vegetables, noodles, rice, or meat      Sample menu for a clear liquid diet:   •Breakfast: 1 cup of juice, ¾ cup of clear broth, ½ cup of lemon gelatin, and 1 cup of coffee with sugar      •Morning snack: 1 cup of a clear sports drink      •Lunch: ½ cup of juice, ¾ cup of clear broth, ¾ cup of lemon-lime soda, and 1 popsicle (equals about 2 ounces of liquid)      •Afternoon snack: 1 popsicle       •Evening meal: ½ cup of juice, ¾ cup of clear broth, ¾ cup of ginger ale, ½ cup of flavored gelatin, and 1 cup of herbal tea with honey or sugar      •Evening snack: 1 cup of flavored gelatin      Risks: The clear liquid diet does not provide all the nutrients you need. You may need to drink a nutrition supplement if you have to follow this diet for more than 3 days. If you do not follow this diet, you may continue to have diarrhea, nausea, and vomiting if you were asked to follow this diet because of these problems.        © Copyright [employer] 2022           back to top                          © Copyright [employer] 2022

## 2022-08-12 NOTE — CONSULT NOTE ADULT - ASSESSMENT
Abdominal pain  N/V/D    CT noted  Planned for repeat CT, f/u  Ordered GI PCR   Ordered urine toxicology  Anti-emetics  Pain control  F/u blood cultures  Will follow    I reviewed the overnight course of events on the unit, re-confirming the patient history. I discussed the care with the patient and their family  Differential diagnosis and plan of care discussed with patient after the evaluation  35 minutes spent on total encounter of which more than fifty percent of the encounter was spent counseling and/or coordinating care by the attending physician.  Advanced care planning was discussed with patient and family.  Advanced care planning forms were reviewed and discussed.  Risks, benefits and alternatives of gastroenterologic procedures were discussed in detail and all questions were answered.

## 2022-08-12 NOTE — ED PROVIDER NOTE - OBJECTIVE STATEMENT
27-year-old F with no pmhx BIBA presents with c/o worsening abdominal pain and vomiting with fever today. Pt works as a nurse, has had abdominal pain, nausea, vomiting, diarrhea x 3 days.  Patient denies any fever no blood in the stool no recent travel. 27-year-old F with no pmhx BIBA presents with c/o worsening abdominal pain and vomiting with fever today. Pt works as a nurse, has had abdominal pain, nausea, vomiting, diarrhea x 3 days. Pt was seen in ED yesterday and had labs, ct abdomen/pelvis which showed proctitis, was discharged home on zofran and PPI however unable to  meds this morning. States that pain is worse and not able to tolerate anything. Patient states that she has a fever this morning. Denies CP, SOB, cough, rash.

## 2022-08-12 NOTE — H&P ADULT - ATTENDING COMMENTS
Patient is a 28 yo female with no PMHx presenting with abdominal pain, n/v/d since the past 3-4 days, now admitted for proctitis confirmed on CT  Improved PO tolerance with use of IV acetaminophen, protonix, and zofran, currently tolerating CLD  Physical exam  T(C): 37.8 (08-12-22 @ 14:41), Max: 38.8 (08-12-22 @ 12:04)  HR: 96 (08-12-22 @ 14:41) (71 - 110)  BP: 109/80 (08-12-22 @ 14:41) (106/72 - 109/80)  RR: 16 (08-12-22 @ 14:41) (16 - 18)  SpO2: 100% (08-12-22 @ 14:41) (98% - 100%)    Physical Exam:  Gen: AO x3, well appearing, age congruent female, appropriately responsive, NAD  HEENT: NCAT, PEERLA b/l, EOMI b/l, no conjunctival erythema, MMM  Cardio: regular rate and rhythm, +s1s2, no murmurs, rubs, or gallops  Pulm: CTA b/l, no wheezes, rales or rhonchi  Abdomen: hyperactive bowel sounds. TTP diffusely around abdomen, most notably in the epigastrium, RUQ, RLQ.  Extremities: no clubbing, cyanosis or edema, +2 pedal pulses  Skin: warm and dry    Continue supportive measure  f/u BCx, UA, UCx, STI screening  Admit to general medical floor

## 2022-08-12 NOTE — ED ADULT NURSE NOTE - NSIMPLEMENTINTERV_GEN_ALL_ED
Implemented All Universal Safety Interventions:  [place] to call system. Call bell, personal items and telephone within reach. Instruct patient to call for assistance. Room bathroom lighting operational. Non-slip footwear when patient is off stretcher. Physically safe environment: no spills, clutter or unnecessary equipment. Stretcher in lowest position, wheels locked, appropriate side rails in place.

## 2022-08-12 NOTE — ED PROVIDER NOTE - ATTENDING APP SHARED VISIT CONTRIBUTION OF CARE
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 but she developed a fever has more pain and is still unable to tolerate anything.  bib mom for eval. mom states last time mom had similar sx she was pregnant  on eval  wd wn female awake alert no distress  heent nc at mmm perrla eomi sclera anicteric op clear nog f r   neck supple   cor tachy no m chest cta  abd soft but pt describes pain "all over" mostly on lower abd midline and rlq no cvat no scars  ext normal  neuro normal  plan labs gi consult consider rpt imaging due to occurrence of fever and worsening of sx

## 2022-08-12 NOTE — ED ADULT NURSE NOTE - CHIEF COMPLAINT
Internal Medicine Teaching Service  Progress Note    Patient: Bienvenido Lester Date of Service: 6/25/2019   YOB: 1969 Admission Date: 6/23/2019   MRN: [id number] Attending: Janiya Stout MD       SUBJECTIVE     Bienvenido Lester is a 49 year old male who was admitted on 6/23/2019 for lightheadedness and dizziness in the setting of melena and a known abdominal mass.      IE:    No acute events overnight. Patient has remained NPO and is tolerating it well. He received a second unit of pRBC yesterday. He has not had any BMs for 2 days. He is able to ambulate to the bathroom without assistance and has no other concerns.     Patient denies any chest pain, pleuritic pain, dyspnea, difficulty with ambulation, abdominal pain, vision changes, headaches, nausea, or weakness.     OBJECTIVE     Vital signs:    Visit Vitals  /67 (BP Location: [facility], Patient Position: Semi-Pickett's)   Pulse 82   Temp 98.2 °F (36.8 °C) (Oral)   Resp 16   Ht 6' (1.829 m)   Wt 107.1 kg   SpO2 98%   BMI 32.02 kg/m²       Gen:   NAD, comfortable appearing male laying back in bed, conversational and responding to all questions appropriately.   HEENT:   Sclera anicteric, conjunctival pallor, slight pallor to tongue, no LAD  CVS:   RRR, no M/R/G, S1/S2 normal, no JVD, no B/L LE edema. 2+ pulses in UE and LE.  Pulm:   CTAB, no W/C/R, no retractions or increased work of breathing  GI:    +BS, soft, non-tender and non-distended to deep palpation  MSK:    ROM normal throughout, strength 5/5 in upper extremities  Skin:   No rashes, lesions, ecchymoses or jaundice. Petaloid nails.  Neuro:  No gross motor or sensory deficits, no facial droop or dysarthria  Psych:   Appropriate mood/affect    I&O'S / LABS / IMAGING     I/Os:      Intake/Output Summary (Last 24 hours) at 6/25/2019 0735  Last data filed at 6/25/2019 0600  Gross per 24 hour   Intake 1864 ml   Output 1535 ml   Net 329 ml       LAB RESULTS  Recent Labs   Lab 06/25/19  3789  06/24/19  2217  06/24/19  0346   WBC 5.9  --   --  7.2   HCT 26.3* 24.2*   < > 22.8*   HGB 8.3* 7.9*   < > 7.4*   *  --   --  120*    < > = values in this interval not displayed.     Recent Labs   Lab 06/25/19  0401 06/24/19  0346   SODIUM 142 143   POTASSIUM 3.9 4.0   CHLORIDE 112* 112*   CO2 26 27   GLUCOSE 82 105*   BUN 7 13   CREATININE 1.08 1.04       IMAGING  CT Chest Abdomen Pelvis - 6/25/19:    Impression:   1.  Exophytic, heterogeneously enhancing 1.8x3.4x2.1 cm mass arising from the posterior wall of the gastric antrum demonstrates macroscopic fat. Findings raise suspicion for possible gastric angiomyolipoma versus less likely gastric lipoma. These imaging findings would be atypical for gastrointestinal stromal tumor or liposarcoma, repeat tissue sampling or excision as indicated may be considered.   2.  No pathologic lymphadenopathy.   3.  Unchanged pulmonary nodules within the right middle lobe measuring up to 5 mm. Attention on follow-up is recommended.   4.  Unchanged mild paraseptal edematous changes within the lung apices, right greater than left.   5.  Redemonstration of innumerable hepatic and bilateral renal cysts suggesting autosomal dominant polycystic kidney disease. I have personally reviewed the images and modified the resident's report as necessary.       ASSESSMENT & PLAN     Bienvenido Lester is a 49 year old male who was admitted on 6/23/2019 for dizziness, lightheadedness, and upper GI bleed in the setting of a gastric mass of unknown etiology. He was anemic and received 2u pRBC and is now hemodynamically stable an appropriate improvement in Hgb. He is awaiting EGD by the GI team to further evaluate the gastric mass.    1. Melena - Likely due to gastric mass of unknown etiology. Now s/p 2u pRBC on 6/23 and 6/24 with an appropriate improvement in Hgb. CT A/P on 6/24 suggested angiomyolipoma vs gastric lipoma (malignancy less likely), but will receive EGD to evaluate further.   -  GI consulted, plan for EGD 6/25.   - Monitor H&H q6h  - Transfuse if Hgb <7, maintain active T&S  - IVFs at 50  - Pantoprazole IV 8mg/h  - Supp iron, folate, thiamine as below    2. Anemia - Hgb was 5.9 upon presentation that has improved to ~8s after 2u pRBC on 6/23 and 6/24. Most likely 2/2 blood loss related to gastric mass. Also possible that an underlying malignancy could be contributing to ACD.   - Iron 200 mg IV qday  - Holding folate and thiamine supplementation (NPO for EGD)   - Repeat H&H q6h    Chronic:  1. HTN: Monitor, Hydralazine 10 mg q4h PRN for Ps > 160, Pd > 110  2. ADPKD: CT C/A/P on 6/24 demonstrated multiple hepatic and renal cysts c/f ADPKD. NTD now, can follow up as outpatient.  3. Pulmonary Nodules: CT C/A/P on 6/24 redemonstrated 5mm nodules in RML unchanged from prior study. Radiology recommended follow-up, NTD this admission.    FEN:  F: IVF at 50cc/h  E: supp PRN  N: NPO for EGD    Dispo: no anticipated barriers to home discharge once active issues resolved                 Quality Indicators   Heart failure? No  Stroke measures indicated? no  AMI? NO  Pneumonia? NO  DVT/VTE Prophylaxis:  VTE Pharmacologic Prophylaxis: No pharmacologic Venous Thromboembolism prophylaxis due to Active Bleeding / Risk of Bleeding  VTE Mechanical Prophylaxis: Yes    Assessment and plan discussed with Janiya Stout MD who agrees with the above. Please see addendum for additional information.    Signed:  Roger Nichole MD  PGY-1 Transitional Year Resident  Pager: [phone]  06/25/19  7:35 AM    Please contact cross cover ([phone] ) during the following time periods:   Mon-Fri: 8p-7a   Sat/Sun: 11a-7a    Follow  I saw and evaluated the patient discussed with the team reviewed their documentation all labs and agree with the residents findings and A/P, acute blood loss anemia secondary to gastric bleeding, for endoscopy today       The patient is a 27y Female complaining of abdominal pain.

## 2022-08-13 DIAGNOSIS — U07.1 COVID-19: ICD-10-CM

## 2022-08-13 DIAGNOSIS — R10.9 UNSPECIFIED ABDOMINAL PAIN: ICD-10-CM

## 2022-08-13 PROCEDURE — 99233 SBSQ HOSP IP/OBS HIGH 50: CPT | Mod: GC

## 2022-08-13 RX ORDER — KETOROLAC TROMETHAMINE 30 MG/ML
15 SYRINGE (ML) INJECTION ONCE
Refills: 0 | Status: DISCONTINUED | OUTPATIENT
Start: 2022-08-13 | End: 2022-08-13

## 2022-08-13 RX ORDER — METOCLOPRAMIDE HCL 10 MG
10 TABLET ORAL ONCE
Refills: 0 | Status: COMPLETED | OUTPATIENT
Start: 2022-08-13 | End: 2022-08-13

## 2022-08-13 RX ORDER — ACETAMINOPHEN 500 MG
325 TABLET ORAL EVERY 4 HOURS
Refills: 0 | Status: DISCONTINUED | OUTPATIENT
Start: 2022-08-13 | End: 2022-08-13

## 2022-08-13 RX ORDER — KETOROLAC TROMETHAMINE 30 MG/ML
30 SYRINGE (ML) INJECTION EVERY 6 HOURS
Refills: 0 | Status: DISCONTINUED | OUTPATIENT
Start: 2022-08-13 | End: 2022-08-14

## 2022-08-13 RX ORDER — KETOROLAC TROMETHAMINE 30 MG/ML
15 SYRINGE (ML) INJECTION EVERY 6 HOURS
Refills: 0 | Status: DISCONTINUED | OUTPATIENT
Start: 2022-08-13 | End: 2022-08-14

## 2022-08-13 RX ORDER — POTASSIUM CHLORIDE 20 MEQ
40 PACKET (EA) ORAL ONCE
Refills: 0 | Status: COMPLETED | OUTPATIENT
Start: 2022-08-13 | End: 2022-08-13

## 2022-08-13 RX ORDER — SODIUM CHLORIDE 9 MG/ML
1000 INJECTION INTRAMUSCULAR; INTRAVENOUS; SUBCUTANEOUS
Refills: 0 | Status: DISCONTINUED | OUTPATIENT
Start: 2022-08-13 | End: 2022-08-14

## 2022-08-13 RX ORDER — ACETAMINOPHEN 500 MG
650 TABLET ORAL EVERY 6 HOURS
Refills: 0 | Status: DISCONTINUED | OUTPATIENT
Start: 2022-08-13 | End: 2022-08-14

## 2022-08-13 RX ADMIN — Medication 5 MILLIGRAM(S): at 21:41

## 2022-08-13 RX ADMIN — SODIUM CHLORIDE 115 MILLILITER(S): 9 INJECTION INTRAMUSCULAR; INTRAVENOUS; SUBCUTANEOUS at 01:24

## 2022-08-13 RX ADMIN — ONDANSETRON 4 MILLIGRAM(S): 8 TABLET, FILM COATED ORAL at 15:40

## 2022-08-13 RX ADMIN — Medication 30 MILLIGRAM(S): at 20:15

## 2022-08-13 RX ADMIN — Medication 10 MILLIGRAM(S): at 01:24

## 2022-08-13 RX ADMIN — Medication 30 MILLIGRAM(S): at 19:45

## 2022-08-13 RX ADMIN — Medication 650 MILLIGRAM(S): at 15:40

## 2022-08-13 RX ADMIN — ONDANSETRON 4 MILLIGRAM(S): 8 TABLET, FILM COATED ORAL at 06:53

## 2022-08-13 RX ADMIN — Medication 15 MILLIGRAM(S): at 06:05

## 2022-08-13 RX ADMIN — Medication 30 MILLIGRAM(S): at 12:35

## 2022-08-13 RX ADMIN — SODIUM CHLORIDE 115 MILLILITER(S): 9 INJECTION INTRAMUSCULAR; INTRAVENOUS; SUBCUTANEOUS at 09:42

## 2022-08-13 RX ADMIN — Medication 15 MILLIGRAM(S): at 07:00

## 2022-08-13 RX ADMIN — Medication 650 MILLIGRAM(S): at 07:00

## 2022-08-13 RX ADMIN — Medication 30 MILLIGRAM(S): at 12:55

## 2022-08-13 RX ADMIN — Medication 650 MILLIGRAM(S): at 06:06

## 2022-08-13 RX ADMIN — Medication 40 MILLIEQUIVALENT(S): at 15:41

## 2022-08-13 RX ADMIN — Medication 650 MILLIGRAM(S): at 15:50

## 2022-08-13 NOTE — PROGRESS NOTE ADULT - SUBJECTIVE AND OBJECTIVE BOX
Patient is a 27y old  Female who presents with a chief complaint of Acute Proctitis (13 Aug 2022 10:48)      FROM ADMISSION H+P:   HPI:  Patient is a 27y F PMHx of GERD. Patient was seen in the ED last night with nausea/vomiting and abdominal pain since the past 3-4 days. Patient was also found to be febrile to 101F and was experiencing worsening abdominal symptoms. CT scan of abdomen revealed acute proctitis . Patient states her symptoms worsened overnight which prompted her to come back to the ER. She states her symptoms occurred spontaneously 4 days ago. Her abdominal pain is diffuse, vomiting and diarrhea lack blood or any ground coffee like substances. She had difficulty holding down and any food or water w/o vomiting. SShe came back to the hospital today because her abdominal pain was still persistent. She vomited 3 times today, but diarrhea has stopped yesterday. Her last menstrual cycle was on , they're usually regular cycles, one per month. She does occasionally experience bloating and cramps during menstruation and she is due for another this soon. She denies any prior abdominal surgeries. Otherwise, experiences no symptoms.       ED Course:   Vitals: T101.8F, , / 72, RR 18, SPO2 100%   Labs: K 3.4, repleted  EKG: Normal sinus HR 87,   Imaging:  CT A/P: Suspected mild rectal wall thickening and possible mild perirectal stranding, suspicious for proctitis. Appendix minimally distended with fluid. No periappendiceal inflammation.  CXR: no acute findings  RUQ US: Normal right upper quadrant abdominal ultrasound.  Given in the ED: 2L NS, Ofirmev, Pepcid, Toradol, Zofran   Currently tolerating jello  (12 Aug 2022 16:45)      ----  INTERVAL HPI/OVERNIGHT EVENTS: Pt seen and evaluated at the bedside. Patient mentioned that abdominal pain is improving, denies N/V.     ----  PAST MEDICAL & SURGICAL HISTORY:      FAMILY HISTORY:      Allergies    No Known Allergies    Intolerances        ----  REVIEW OF SYSTEMS:  CONSTITUTIONAL: denies fever, chills, fatigue, weakness  CARDIOVASCULAR: denies chest pain, chest pressure, palpitations  RESPIRATORY: denies shortness of breath, sputum production  GASTROINTESTINAL: denies nausea, vomiting, diarrhea, moderate abdominal pain  GENITOURINARY: denies dysuria, discharge  HEMATOLOGIC: denies gross bleeding, bruising  PSYCHIATRIC: denies recent changes in anxiety, depression  ENDOCRINOLOGIC: denies sweating, cold or heat intolerance    ----  PHYSICAL EXAM:  GENERAL: patient appears well, no acute distress, appropriately interactive  LUNGS: good air entry bilaterally, clear to auscultation, no wheezing or rhonchi appreciated  HEART: S1/S2, regular rate and rhythm, no murmurs noted, no noted edema to b/l LE  GASTROINTESTINAL: abdomen is soft, mild tenderness, nondistended, normoactive bowel sounds, no palpable masses  INTEGUMENT: good skin turgor, appropriate for ethnicity, appears well perfused, no jaundice noted  MUSCULOSKELETAL: no clubbing or cyanosis, no obvious deformity  NEUROLOGIC: awake, alert, oriented x3, strength 5/5 UE and LE , no sensory deficits  PSYCHIATRIC: mood is good, affect is congruent with mood, linear and logical thought process  HEME/LYMPH:  no obvious ecchymosis , lympadenopathy    T(C): 38.2 (22 @ 05:11), Max: 38.8 (22 @ 12:04)  HR: 99 (22 @ 05:11) (89 - 110)  BP: 102/66 (22 @ 05:11) (102/62 - 122/66)  RR: 16 (22 @ 05:11) (16 - 18)  SpO2: 98% (22 @ 05:11) (98% - 100%)  Wt(kg): --    ----  I&O's Summary      LABS:                        11.8   7.67  )-----------( 275      ( 12 Aug 2022 13:10 )             35.4         139  |  109<H>  |  12  ----------------------------<  104<H>  3.4<L>   |  22  |  0.67    Ca    8.9      12 Aug 2022 13:10  Mg     1.9         TPro  8.2  /  Alb  3.7  /  TBili  0.9  /  DBili  x   /  AST  16  /  ALT  17  /  AlkPhos  60        Urinalysis Basic - ( 12 Aug 2022 20:10 )    Color: Yellow / Appearance: Clear / S.015 / pH: x  Gluc: x / Ketone: Large  / Bili: Small / Urobili: Negative   Blood: x / Protein: 15 / Nitrite: Negative   Leuk Esterase: Trace / RBC: 0-2 /HPF / WBC 6-10   Sq Epi: x / Non Sq Epi: Moderate / Bacteria: x      CAPILLARY BLOOD GLUCOSE                    ----  Personally reviewed:  Vital sign trends: [  ] yes    [  ] no     [  ] n/a  Laboratory results: [  ] yes    [  ] no     [  ] n/a  Radiology results: [  ] yes    [  ] no     [  ] n/a  Culture results: [  ] yes    [  ] no     [  ] n/a  Consultant recommendations: [  ] yes    [  ] no     [  ] n/a         Patient is a 27y old  Female who presents with a chief complaint of Acute Proctitis (13 Aug 2022 10:48)      FROM ADMISSION H+P:   HPI:  Patient is a 27y F PMHx of GERD. Patient was seen in the ED last night with nausea/vomiting and abdominal pain since the past 3-4 days. Patient was also found to be febrile to 101F and was experiencing worsening abdominal symptoms. CT scan of abdomen revealed acute proctitis . Patient states her symptoms worsened overnight which prompted her to come back to the ER. She states her symptoms occurred spontaneously 4 days ago. Her abdominal pain is diffuse, vomiting and diarrhea lack blood or any ground coffee like substances. She had difficulty holding down and any food or water w/o vomiting. SShe came back to the hospital today because her abdominal pain was still persistent. She vomited 3 times today, but diarrhea has stopped yesterday. Her last menstrual cycle was on , they're usually regular cycles, one per month. She does occasionally experience bloating and cramps during menstruation and she is due for another this soon. She denies any prior abdominal surgeries. Otherwise, experiences no symptoms.       ED Course:   Vitals: T101.8F, , / 72, RR 18, SPO2 100%   Labs: K 3.4, repleted  EKG: Normal sinus HR 87,   Imaging:  CT A/P: Suspected mild rectal wall thickening and possible mild perirectal stranding, suspicious for proctitis. Appendix minimally distended with fluid. No periappendiceal inflammation.  CXR: no acute findings  RUQ US: Normal right upper quadrant abdominal ultrasound.  Given in the ED: 2L NS, Ofirmev, Pepcid, Toradol, Zofran   Currently tolerating jello  (12 Aug 2022 16:45)      ----  INTERVAL HPI/OVERNIGHT EVENTS: Pt seen and evaluated at the bedside. Patient mentioned that abdominal pain is improving, denies N/V.     ----  PAST MEDICAL & SURGICAL HISTORY:      FAMILY HISTORY:      Allergies    No Known Allergies    Intolerances        ----  REVIEW OF SYSTEMS:  CONSTITUTIONAL: denies fever, chills, fatigue, weakness  CARDIOVASCULAR: denies chest pain, chest pressure, palpitations  RESPIRATORY: denies shortness of breath, sputum production  GASTROINTESTINAL: denies nausea, vomiting, diarrhea, moderate abdominal pain  GENITOURINARY: denies dysuria, discharge  HEMATOLOGIC: denies gross bleeding, bruising  PSYCHIATRIC: denies recent changes in anxiety, depression  ENDOCRINOLOGIC: denies sweating, cold or heat intolerance    ----  PHYSICAL EXAM:  GENERAL: patient appears well, no acute distress, appropriately interactive  LUNGS: good air entry bilaterally, clear to auscultation, no wheezing or rhonchi appreciated  HEART: S1/S2, regular rate and rhythm, no murmurs noted, no noted edema to b/l LE  GASTROINTESTINAL: abdomen is soft, mild tenderness, nondistended, normoactive bowel sounds  INTEGUMENT: good skin turgor, appropriate for ethnicity, appears well perfused, no jaundice noted  MUSCULOSKELETAL: no clubbing or cyanosis, no obvious deformity  NEUROLOGIC: awake, alert, oriented x3  PSYCHIATRIC: mood is good  HEME/LYMPH:  no obvious ecchymosis     T(C): 38.2 (22 @ 05:11), Max: 38.8 (22 @ 12:04)  HR: 99 (22 @ 05:11) (89 - 110)  BP: 102/66 (22 @ 05:11) (102/62 - 122/66)  RR: 16 (22 @ 05:11) (16 - 18)  SpO2: 98% (22 @ 05:11) (98% - 100%)  Wt(kg): --    ----  I&O's Summary      LABS:                        11.8   7.67  )-----------( 275      ( 12 Aug 2022 13:10 )             35.4         139  |  109<H>  |  12  ----------------------------<  104<H>  3.4<L>   |  22  |  0.67    Ca    8.9      12 Aug 2022 13:10  Mg     1.9         TPro  8.2  /  Alb  3.7  /  TBili  0.9  /  DBili  x   /  AST  16  /  ALT  17  /  AlkPhos  60        Urinalysis Basic - ( 12 Aug 2022 20:10 )    Color: Yellow / Appearance: Clear / S.015 / pH: x  Gluc: x / Ketone: Large  / Bili: Small / Urobili: Negative   Blood: x / Protein: 15 / Nitrite: Negative   Leuk Esterase: Trace / RBC: 0-2 /HPF / WBC 6-10   Sq Epi: x / Non Sq Epi: Moderate / Bacteria: x      CAPILLARY BLOOD GLUCOSE                    ----  Personally reviewed:  Vital sign trends: [x  ] yes    [  ] no     [  ] n/a  Laboratory results: [ x ] yes    [  ] no     [  ] n/a  Radiology results: [ x ] yes    [  ] no     [  ] n/a  Culture results: [x  ] yes    [  ] no     [  ] n/a  Consultant recommendations: [ x ] yes    [  ] no     [  ] n/a         Patient is a 27y old  Female who presents with a chief complaint of Acute Proctitis (13 Aug 2022 10:48)      FROM ADMISSION H+P:   HPI:  Patient is a 27y F PMHx of GERD. Patient was seen in the ED last night with nausea/vomiting and abdominal pain since the past 3-4 days. Patient was also found to be febrile to 101F and was experiencing worsening abdominal symptoms. CT scan of abdomen revealed acute proctitis . Patient states her symptoms worsened overnight which prompted her to come back to the ER. She states her symptoms occurred spontaneously 4 days ago. Her abdominal pain is diffuse, vomiting and diarrhea lack blood or any ground coffee like substances. She had difficulty holding down and any food or water w/o vomiting. SShe came back to the hospital today because her abdominal pain was still persistent. She vomited 3 times today, but diarrhea has stopped yesterday. Her last menstrual cycle was on , they're usually regular cycles, one per month. She does occasionally experience bloating and cramps during menstruation and she is due for another this soon. She denies any prior abdominal surgeries. Otherwise, experiences no symptoms.           ----  REVIEW OF SYSTEMS:  CONSTITUTIONAL: denies fever, chills, fatigue, weakness  CARDIOVASCULAR: denies chest pain, chest pressure, palpitations  RESPIRATORY: denies shortness of breath, sputum production  GASTROINTESTINAL: denies nausea, vomiting, diarrhea, moderate abdominal pain  GENITOURINARY: denies dysuria, discharge  HEMATOLOGIC: denies gross bleeding, bruising  PSYCHIATRIC: denies recent changes in anxiety, depression  ENDOCRINOLOGIC: denies sweating, cold or heat intolerance    ----  PHYSICAL EXAM:  GENERAL: patient appears well, no acute distress,   LUNGS: good air entry bilaterally, clear to auscultation, no wheezing or rhonchi   HEART: S1/S2, regular rate and rhythm, no murmurs noted, no noted edema to b/l LE  GASTROINTESTINAL: soft, mild tenderness, nondistended, normoactive bowel sounds  INTEGUMENT: good skin turgor, appropriate for ethnicity, appears well perfused, no jaundice noted  MUSCULOSKELETAL: no clubbing or cyanosis, no obvious deformity  NEUROLOGIC: awake, alert, oriented x3  motor /sensory intact   PSYCHIATRIC: mood is good  HEME/LYMPH:  no obvious ecchymosis   gu intact          ED Course:   Vitals: T101.8F, , / 72, RR 18, SPO2 100%   Labs: K 3.4, repleted  EKG: Normal sinus HR 87,   Imaging:  CT A/P: Suspected mild rectal wall thickening and possible mild perirectal stranding, suspicious for proctitis. Appendix minimally distended with fluid. No periappendiceal inflammation.  CXR: no acute findings  RUQ US: Normal right upper quadrant abdominal ultrasound.  Given in the ED: 2L NS, Ofirmev, Pepcid, Toradol, Zofran   Currently tolerating jello  (12 Aug 2022 16:45)      ----  INTERVAL HPI/OVERNIGHT EVENTS: Pt seen and evaluated at the bedside. Patient mentioned that abdominal pain is improving, denies N/V.     ----  PAST MEDICAL & SURGICAL HISTORY:      FAMILY HISTORY:      Allergies    No Known Allergies    Intolerances    T(C): 38.2 (22 @ 05:11), Max: 38.8 (22 @ 12:04)  HR: 99 (22 @ 05:11) (89 - 110)  BP: 102/66 (22 @ 05:11) (102/62 - 122/66)  RR: 16 (22 @ 05:11) (16 - 18)  SpO2: 98% (22 @ 05:11) (98% - 100%)  Wt(kg): --    ----  I&O's Summary      LABS:                        11.8   7.67  )-----------( 275      ( 12 Aug 2022 13:10 )             35.4         139  |  109<H>  |  12  ----------------------------<  104<H>  3.4<L>   |  22  |  0.67    Ca    8.9      12 Aug 2022 13:10  Mg     1.9         TPro  8.2  /  Alb  3.7  /  TBili  0.9  /  DBili  x   /  AST  16  /  ALT  17  /  AlkPhos  60        Urinalysis Basic - ( 12 Aug 2022 20:10 )    Color: Yellow / Appearance: Clear / S.015 / pH: x  Gluc: x / Ketone: Large  / Bili: Small / Urobili: Negative   Blood: x / Protein: 15 / Nitrite: Negative   Leuk Esterase: Trace / RBC: 0-2 /HPF / WBC 6-10   Sq Epi: x / Non Sq Epi: Moderate / Bacteria: x                        ----  Personally reviewed:  Vital sign trends: [x  ] yes    [  ] no     [  ] n/a  Laboratory results: [ x ] yes    [  ] no     [  ] n/a  Radiology results: [ x ] yes    [  ] no     [  ] n/a  Culture results: [x  ] yes    [  ] no     [  ] n/a  Consultant recommendations: [ x ] yes    [  ] no     [  ] n/a

## 2022-08-13 NOTE — PROGRESS NOTE ADULT - SUBJECTIVE AND OBJECTIVE BOX
[facility]  Pravin Argueta PA-C  [address] [place] [id number] [phone]      INTERVAL HPI/OVERNIGHT EVENTS:  Pt s/e  Reports abdominal pain slightly improved  Nausea improving. No vomiting. Tolerating clear liquids  Denies further GI complaints    MEDICATIONS  (STANDING):  melatonin 5 milliGRAM(s) Oral at bedtime  sodium chloride 0.9%. 1000 milliLiter(s) (115 mL/Hr) IV Continuous <Continuous>    MEDICATIONS  (PRN):  acetaminophen     Tablet .. 650 milliGRAM(s) Oral every 6 hours PRN Temp greater or equal to 38C (100.4F), Mild Pain (1 - 3)  ketorolac   Injectable 15 milliGRAM(s) IV Push every 6 hours PRN Moderate Pain (4 - 6)  ketorolac   Injectable 30 milliGRAM(s) IV Push every 6 hours PRN Severe Pain (7 - 10)  ondansetron Injectable 4 milliGRAM(s) IV Push every 8 hours PRN Nausea and/or Vomiting      Allergies  No Known Allergies      PHYSICAL EXAM:   Vital Signs:  Vital Signs Last 24 Hrs  T(C): 38.2 (13 Aug 2022 05:11), Max: 38.8 (12 Aug 2022 12:04)  T(F): 100.8 (13 Aug 2022 05:11), Max: 101.8 (12 Aug 2022 12:04)  HR: 99 (13 Aug 2022 05:11) (89 - 110)  BP: 102/66 (13 Aug 2022 05:11) (102/62 - 122/66)  BP(mean): --  RR: 16 (13 Aug 2022 05:11) (16 - 18)  SpO2: 98% (13 Aug 2022 05:11) (98% - 100%)    Parameters below as of 13 Aug 2022 05:11  Patient On (Oxygen Delivery Method): room air      Daily Height in cm: 167.64 (12 Aug 2022 12:04)    Daily     GENERAL:  Appears stated age  ABDOMEN:  Soft, non-tender, non-distended  NEURO:  Alert    LABS:                        11.8   7.67  )-----------( 275      ( 12 Aug 2022 13:10 )             35.4         139  |  109<H>  |  12  ----------------------------<  104<H>  3.4<L>   |  22  |  0.67    Ca    8.9      12 Aug 2022 13:10  Mg     1.9         TPro  8.2  /  Alb  3.7  /  TBili  0.9  /  DBili  x   /  AST  16  /  ALT  17  /  AlkPhos  60        Urinalysis Basic - ( 12 Aug 2022 20:10 )    Color: Yellow / Appearance: Clear / S.015 / pH: x  Gluc: x / Ketone: Large  / Bili: Small / Urobili: Negative   Blood: x / Protein: 15 / Nitrite: Negative   Leuk Esterase: Trace / RBC: 0-2 /HPF / WBC 6-10   Sq Epi: x / Non Sq Epi: Moderate / Bacteria: x    RADIOLOGY:  < from: CT Abdomen and Pelvis No Cont (22 @ 15:25) >    ACC: [id number] EXAM:  CT ABDOMEN AND PELVIS                          PROCEDURE DATE:  2022          INTERPRETATION:  CLINICAL INFORMATION: Worsening abdominal pain, fever   and vomiting.    COMPARISON: 2022.    CONTRAST/COMPLICATIONS:  IVContrast: NONE  Oral Contrast: NONE  Complications: None reported at time of study completion  Intravascular contrast administered at the time of prior CT examination   2022 at 2:51 AM.    PROCEDURE:  CT of the Abdomen and Pelvis was performed.  Sagittal and coronal reformats were performed.    FINDINGS:  LOWER CHEST: Nonspecific nodular posterior pleural thickening.    LIVER: Within normal limits.  BILE DUCTS: Normal caliber.  GALLBLADDER: Vicarious excretion of contrast within the gallbladder lumen   noted.  SPLEEN: Within normal limits.  PANCREAS: Within normal limits.  ADRENALS: Within normal limits.  KIDNEYS/URETERS: Within normal limits.    BLADDER: Within normal limits.  REPRODUCTIVE ORGANS: Uterus unremarkable. Soft tissue prominence right   adnexa, possibly related to the right ovarian parenchyma.    BOWEL: No bowel obstruction. Appendix is normal.  PERITONEUM: No ascites.  VESSELS: Within normal limits.  RETROPERITONEUM/LYMPH NODES: No lymphadenopathy.  ABDOMINAL WALL: Within normal limits.  BONES: Within normal limits.    IMPRESSION: No evidence for mechanical bowel obstruction. No colonic wall   thickening. Appendix unremarkable. Soft tissue prominence right adnexa,   possibly related to the right ovarian parenchyma.          --- End of Report ---            JCARLOS ARAGON MD; Attending Radiologist  This document has been electronically signed. Aug 12 2022  3:51PM    < end of copied text >

## 2022-08-13 NOTE — PROGRESS NOTE ADULT - PROBLEM SELECTOR PLAN 1
Unclear etiology possible secondary to prominence in the right ovarian/ proctitis  - Repeat CT a/p showed no evidence for mechanical bowel obstruction. No colonic wall thickening. Appendix unremarkable. Soft tissue prominence right adnexa, possibly related to the right ovarian parenchyma.  - Will order US pelvis   - Pain regimen: Tylenol q6h PRN for mild pain and Toradol 25 mg q6h and 50 mg q6h PRN for moderate and severe pain  - On clear liquids  - Zofran PRN for nausea/vomiting   - GI, Dr. Atwood Unclear etiology possible secondary to prominence in the right ovarian/ proctitis  - Repeat CT a/p showed no evidence for mechanical bowel obstruction. No colonic wall thickening. Appendix unremarkable. Soft tissue prominence right adnexa, possibly related to the right ovarian parenchyma.  - Will order US pelvis   - Pain regimen: Tylenol q6h PRN for mild pain and Toradol 25 mg q6h and 50 mg q6h PRN for moderate and severe pain  - On clear liquids  - Zofran PRN for nausea/vomiting   - GI, Dr. Ellington

## 2022-08-13 NOTE — PROGRESS NOTE ADULT - TIME BILLING
pt seen and examine today see above plan - pt  Unclear etiology possible secondary to prominence in the right ovarian/ proctitis  - Repeat CT a/p showed no evidence for mechanical bowel obstruction. No colonic wall thickening. Appendix unremarkable. Soft tissue prominence right adnexa, possibly related to the right ovarian parenchyma.  - US pelvis,  Tylenol q6h PRN for mild pain and Toradol 25 mg q6h and 50 mg q6h PRN for moderate and severe pain ,  On clear liquid advance  as tolerated . pt seen and examine today see above plan - pt  Unclear etiology possible secondary to prominence in the right ovarian/ proctitis  - Repeat CT a/p showed no evidence for mechanical bowel obstruction. No colonic wall thickening. Appendix unremarkable. Soft tissue prominence right adnexa, possibly related to the right ovarian parenchyma.  - US pelvis,  Tylenol q6h PRN for mild pain and Toradol 25 mg q6h and 50 mg q6h PRN for moderate and severe pain ,  On clear liquid advance  as tolerated , hypokalemia -replaced 40 meq po / one dose.

## 2022-08-14 ENCOUNTER — TRANSCRIPTION ENCOUNTER (OUTPATIENT)
Age: 27
End: 2022-08-14

## 2022-08-14 VITALS
OXYGEN SATURATION: 97 % | RESPIRATION RATE: 16 BRPM | HEART RATE: 78 BPM | DIASTOLIC BLOOD PRESSURE: 76 MMHG | TEMPERATURE: 98 F | SYSTOLIC BLOOD PRESSURE: 122 MMHG

## 2022-08-14 LAB
ANION GAP SERPL CALC-SCNC: 5 MMOL/L — SIGNIFICANT CHANGE UP (ref 5–17)
BASOPHILS # BLD AUTO: 0.04 K/UL — SIGNIFICANT CHANGE UP (ref 0–0.2)
BASOPHILS NFR BLD AUTO: 0.6 % — SIGNIFICANT CHANGE UP (ref 0–2)
BUN SERPL-MCNC: 3 MG/DL — LOW (ref 7–23)
C DIFF BY PCR RESULT: SIGNIFICANT CHANGE UP
C DIFF TOX GENS STL QL NAA+PROBE: SIGNIFICANT CHANGE UP
CALCIUM SERPL-MCNC: 8.5 MG/DL — SIGNIFICANT CHANGE UP (ref 8.5–10.1)
CHLORIDE SERPL-SCNC: 110 MMOL/L — HIGH (ref 96–108)
CO2 SERPL-SCNC: 24 MMOL/L — SIGNIFICANT CHANGE UP (ref 22–31)
CREAT SERPL-MCNC: 0.4 MG/DL — LOW (ref 0.5–1.3)
CULTURE RESULTS: SIGNIFICANT CHANGE UP
CULTURE RESULTS: SIGNIFICANT CHANGE UP
EGFR: 139 ML/MIN/1.73M2 — SIGNIFICANT CHANGE UP
EOSINOPHIL # BLD AUTO: 0.19 K/UL — SIGNIFICANT CHANGE UP (ref 0–0.5)
EOSINOPHIL NFR BLD AUTO: 3 % — SIGNIFICANT CHANGE UP (ref 0–6)
GLUCOSE SERPL-MCNC: 77 MG/DL — SIGNIFICANT CHANGE UP (ref 70–99)
HCT VFR BLD CALC: 34.8 % — SIGNIFICANT CHANGE UP (ref 34.5–45)
HGB BLD-MCNC: 11.6 G/DL — SIGNIFICANT CHANGE UP (ref 11.5–15.5)
IMM GRANULOCYTES NFR BLD AUTO: 0.3 % — SIGNIFICANT CHANGE UP (ref 0–1.5)
LYMPHOCYTES # BLD AUTO: 1.8 K/UL — SIGNIFICANT CHANGE UP (ref 1–3.3)
LYMPHOCYTES # BLD AUTO: 28.8 % — SIGNIFICANT CHANGE UP (ref 13–44)
MCHC RBC-ENTMCNC: 25.4 PG — LOW (ref 27–34)
MCHC RBC-ENTMCNC: 33.3 GM/DL — SIGNIFICANT CHANGE UP (ref 32–36)
MCV RBC AUTO: 76.3 FL — LOW (ref 80–100)
MONOCYTES # BLD AUTO: 0.57 K/UL — SIGNIFICANT CHANGE UP (ref 0–0.9)
MONOCYTES NFR BLD AUTO: 9.1 % — SIGNIFICANT CHANGE UP (ref 2–14)
NEUTROPHILS # BLD AUTO: 3.62 K/UL — SIGNIFICANT CHANGE UP (ref 1.8–7.4)
NEUTROPHILS NFR BLD AUTO: 58.2 % — SIGNIFICANT CHANGE UP (ref 43–77)
NRBC # BLD: 0 /100 WBCS — SIGNIFICANT CHANGE UP (ref 0–0)
PLATELET # BLD AUTO: 273 K/UL — SIGNIFICANT CHANGE UP (ref 150–400)
POTASSIUM SERPL-MCNC: 4 MMOL/L — SIGNIFICANT CHANGE UP (ref 3.5–5.3)
POTASSIUM SERPL-SCNC: 4 MMOL/L — SIGNIFICANT CHANGE UP (ref 3.5–5.3)
RBC # BLD: 4.56 M/UL — SIGNIFICANT CHANGE UP (ref 3.8–5.2)
RBC # FLD: 13.7 % — SIGNIFICANT CHANGE UP (ref 10.3–14.5)
SODIUM SERPL-SCNC: 139 MMOL/L — SIGNIFICANT CHANGE UP (ref 135–145)
SPECIMEN SOURCE: SIGNIFICANT CHANGE UP
SPECIMEN SOURCE: SIGNIFICANT CHANGE UP
WBC # BLD: 6.24 K/UL — SIGNIFICANT CHANGE UP (ref 3.8–10.5)
WBC # FLD AUTO: 6.24 K/UL — SIGNIFICANT CHANGE UP (ref 3.8–10.5)

## 2022-08-14 PROCEDURE — 85025 COMPLETE CBC W/AUTO DIFF WBC: CPT

## 2022-08-14 PROCEDURE — 74176 CT ABD & PELVIS W/O CONTRAST: CPT | Mod: MA

## 2022-08-14 PROCEDURE — 87086 URINE CULTURE/COLONY COUNT: CPT

## 2022-08-14 PROCEDURE — 80048 BASIC METABOLIC PNL TOTAL CA: CPT

## 2022-08-14 PROCEDURE — 71045 X-RAY EXAM CHEST 1 VIEW: CPT

## 2022-08-14 PROCEDURE — 87591 N.GONORRHOEAE DNA AMP PROB: CPT

## 2022-08-14 PROCEDURE — 96365 THER/PROPH/DIAG IV INF INIT: CPT

## 2022-08-14 PROCEDURE — 84702 CHORIONIC GONADOTROPIN TEST: CPT

## 2022-08-14 PROCEDURE — 81001 URINALYSIS AUTO W/SCOPE: CPT

## 2022-08-14 PROCEDURE — 83690 ASSAY OF LIPASE: CPT

## 2022-08-14 PROCEDURE — 36415 COLL VENOUS BLD VENIPUNCTURE: CPT

## 2022-08-14 PROCEDURE — 80053 COMPREHEN METABOLIC PANEL: CPT

## 2022-08-14 PROCEDURE — 83605 ASSAY OF LACTIC ACID: CPT

## 2022-08-14 PROCEDURE — 96375 TX/PRO/DX INJ NEW DRUG ADDON: CPT

## 2022-08-14 PROCEDURE — 87637 SARSCOV2&INF A&B&RSV AMP PRB: CPT

## 2022-08-14 PROCEDURE — 87493 C DIFF AMPLIFIED PROBE: CPT

## 2022-08-14 PROCEDURE — 99285 EMERGENCY DEPT VISIT HI MDM: CPT | Mod: 25

## 2022-08-14 PROCEDURE — 87507 IADNA-DNA/RNA PROBE TQ 12-25: CPT

## 2022-08-14 PROCEDURE — 80307 DRUG TEST PRSMV CHEM ANLYZR: CPT

## 2022-08-14 PROCEDURE — 99239 HOSP IP/OBS DSCHRG MGMT >30: CPT | Mod: GC

## 2022-08-14 PROCEDURE — 74018 RADEX ABDOMEN 1 VIEW: CPT

## 2022-08-14 PROCEDURE — 96366 THER/PROPH/DIAG IV INF ADDON: CPT

## 2022-08-14 PROCEDURE — 87040 BLOOD CULTURE FOR BACTERIA: CPT

## 2022-08-14 RX ORDER — NITAZOXANIDE 500 MG/1
1 TABLET ORAL
Qty: 6 | Refills: 0
Start: 2022-08-14 | End: 2022-08-16

## 2022-08-14 RX ORDER — GUAIFENESIN/DEXTROMETHORPHAN 600MG-30MG
5 TABLET, EXTENDED RELEASE 12 HR ORAL ONCE
Refills: 0 | Status: COMPLETED | OUTPATIENT
Start: 2022-08-14 | End: 2022-08-14

## 2022-08-14 RX ORDER — GUAIFENESIN/DEXTROMETHORPHAN 600MG-30MG
100 TABLET, EXTENDED RELEASE 12 HR ORAL ONCE
Refills: 0 | Status: DISCONTINUED | OUTPATIENT
Start: 2022-08-14 | End: 2022-08-14

## 2022-08-14 RX ORDER — MOXIFLOXACIN HYDROCHLORIDE TABLETS, 400 MG 400 MG/1
1 TABLET, FILM COATED ORAL
Qty: 6 | Refills: 0
Start: 2022-08-14 | End: 2022-08-16

## 2022-08-14 RX ORDER — CIPROFLOXACIN LACTATE 400MG/40ML
1 VIAL (ML) INTRAVENOUS
Qty: 6 | Refills: 0
Start: 2022-08-14 | End: 2022-08-16

## 2022-08-14 RX ORDER — CIPROFLOXACIN LACTATE 400MG/40ML
500 VIAL (ML) INTRAVENOUS EVERY 12 HOURS
Refills: 0 | Status: DISCONTINUED | OUTPATIENT
Start: 2022-08-14 | End: 2022-08-14

## 2022-08-14 RX ADMIN — Medication 500 MILLIGRAM(S): at 14:49

## 2022-08-14 RX ADMIN — Medication 30 MILLIGRAM(S): at 12:30

## 2022-08-14 RX ADMIN — ONDANSETRON 4 MILLIGRAM(S): 8 TABLET, FILM COATED ORAL at 11:48

## 2022-08-14 RX ADMIN — Medication 5 MILLILITER(S): at 06:20

## 2022-08-14 RX ADMIN — Medication 30 MILLIGRAM(S): at 06:50

## 2022-08-14 RX ADMIN — Medication 30 MILLIGRAM(S): at 06:20

## 2022-08-14 NOTE — PROGRESS NOTE ADULT - PROBLEM SELECTOR PLAN 2
Saturating > 90% on RA   - supp O2 prn maintain O2 sats >88%.   - monitor volume status closely, avoid aggressive hydration  - Tylenol prn myalgias, fever
Saturating > 90% on RA   - supp O2 prn maintain O2 sats >88%.   - No candidate for decadron 6mg   - monitor volume status closely, avoid aggressive hydration  - Tylenol prn myalgias, fever  -  CBC with diff and CMP QD. Ferritin, crp, d-dimer, procal at admission then will repeat If clinically worsening every 48-72 hours.

## 2022-08-14 NOTE — PROGRESS NOTE ADULT - ASSESSMENT
Abdominal pain  N/V/D    CT noted  Ordered GI PCR  Urine toxicology negative  Anti-emetics  Pain control  F/u blood cultures  Now covid positive, possibly covid induced symptoms  Will follow    I reviewed the overnight course of events on the unit, re-confirming the patient history. I discussed the care with the patient and their family  Differential diagnosis and plan of care discussed with patient after the evaluation  35 minutes spent on total encounter of which more than fifty percent of the encounter was spent counseling and/or coordinating care by the attending physician.  Advanced care planning was discussed with patient and family.  Advanced care planning forms were reviewed and discussed.  Risks, benefits and alternatives of gastroenterologic procedures were discussed in detail and all questions were answered.
Patient is a 28 yo female with no PMHx presenting with abdominal pain, n/v/d since the past 3-4 days, now admitted for proctitis.  
Abdominal pain  N/V/D  Covid    CT noted  GI PCR positive for EAEC, EPEC, and cryptosporidium  Given pt was febrile and still having diarrhea can consider abx  Urine toxicology negative  Anti-emetics  Pain control  F/u blood cultures  Will follow    I reviewed the overnight course of events on the unit, re-confirming the patient history. I discussed the care with the patient and their family  Differential diagnosis and plan of care discussed with patient after the evaluation  35 minutes spent on total encounter of which more than fifty percent of the encounter was spent counseling and/or coordinating care by the attending physician.  Advanced care planning was discussed with patient and family.  Advanced care planning forms were reviewed and discussed.  Risks, benefits and alternatives of gastroenterologic procedures were discussed in detail and all questions were answered.  
Patient is a 26 yo female with no PMHx presenting with abdominal pain, n/v/d since the past 3-4 days, now admitted for proctitis.

## 2022-08-14 NOTE — DISCHARGE NOTE PROVIDER - HOSPITAL COURSE
ADMISSION DATE:  08-12-22    ---  FROM ADMISSION H+P:   HPI:  Patient is a 27y F PMHx of GERD. Patient was seen in the ED last night with nausea/vomiting and abdominal pain since the past 3-4 days. Patient was also found to be febrile to 101F and was experiencing worsening abdominal symptoms. CT scan of abdomen revealed acute proctitis 8/11. Patient states her symptoms worsened overnight which prompted her to come back to the ER. She states her symptoms occurred spontaneously 4 days ago. Her abdominal pain is diffuse, vomiting and diarrhea lack blood or any ground coffee like substances. She had difficulty holding down and any food or water w/o vomiting. SShe came back to the hospital today because her abdominal pain was still persistent. She vomited 3 times today, but diarrhea has stopped yesterday. Her last menstrual cycle was on July 14th, they're usually regular cycles, one per month. She does occasionally experience bloating and cramps during menstruation and she is due for another this soon. She denies any prior abdominal surgeries. Otherwise, experiences no symptoms.       ED Course:   Vitals: T101.8F, , / 72, RR 18, SPO2 100%   Labs: K 3.4, repleted  EKG: Normal sinus HR 87,   Imaging:  CT A/P: Suspected mild rectal wall thickening and possible mild perirectal stranding, suspicious for proctitis. Appendix minimally distended with fluid. No periappendiceal inflammation.  CXR: no acute findings  RUQ US: Normal right upper quadrant abdominal ultrasound.  Given in the ED: 2L NS, Ofirmev, Pepcid, Toradol, Zofran   Currently tolerating jello  (12 Aug 2022 16:45)      ---  HOSPITAL COURSE/PERTINENT LABS/PROCEDURES PERFORMED/PENDING TESTS: Patient admitted for abdominal pain, n/v and diarrhea.  CT a/p showed no evidence for mechanical bowel obstruction. No colonic wall thickening. Appendix unremarkable. Soft tissue prominence right adnexa, possibly related to the right ovarian parenchyma. GI PCR showed EAEC, EPEC, and cryptosporidium. Pain well controlled with pain regimen. Will sent cipro and nitazoxamide x3 days.  Found to be COVID-19 positive, saturating well on RA.       ---  PATIENT CONDITION:  - stable    ---  PHYSICAL EXAM ON DAY OF DISCHARGE:    ---  CONSULTANTS:   GI,  Sheik     ---  TIME SPENT:  I, the attending physician, was physically present for the key portions of the evaluation and management (E/M) service provided. The total amount of time spent reviewing the hospital notes, laboratory values, imaging findings, assessing/counseling the patient, discussing with consultant physicians, social work, nursing staff was -- minutes ADMISSION DATE:  08-12-22    ---  FROM ADMISSION H+P:   HPI:  Patient is a 27y F PMHx of GERD. Patient was seen in the ED last night with nausea/vomiting and abdominal pain since the past 3-4 days. Patient was also found to be febrile to 101F and was experiencing worsening abdominal symptoms. CT scan of abdomen revealed acute proctitis 8/11. Patient states her symptoms worsened overnight which prompted her to come back to the ER. She states her symptoms occurred spontaneously 4 days ago. Her abdominal pain is diffuse, vomiting and diarrhea lack blood or any ground coffee like substances. She had difficulty holding down and any food or water w/o vomiting. SShe came back to the hospital today because her abdominal pain was still persistent. She vomited 3 times today, but diarrhea has stopped yesterday. Her last menstrual cycle was on July 14th, they're usually regular cycles, one per month. She does occasionally experience bloating and cramps during menstruation and she is due for another this soon. She denies any prior abdominal surgeries. Otherwise, experiences no symptoms.       ED Course:   Vitals: T101.8F, , / 72, RR 18, SPO2 100%   Labs: K 3.4, repleted  EKG: Normal sinus HR 87,   Imaging:  CT A/P: Suspected mild rectal wall thickening and possible mild perirectal stranding, suspicious for proctitis. Appendix minimally distended with fluid. No periappendiceal inflammation.  CXR: no acute findings  RUQ US: Normal right upper quadrant abdominal ultrasound.  Given in the ED: 2L NS, Ofirmev, Pepcid, Toradol, Zofran   Currently tolerating jello  (12 Aug 2022 16:45)      ---  HOSPITAL COURSE/PERTINENT LABS/PROCEDURES PERFORMED/PENDING TESTS: Patient admitted for abdominal pain, n/v and diarrhea.  CT a/p showed no evidence for mechanical bowel obstruction. No colonic wall thickening. Appendix unremarkable. Soft tissue prominence right adnexa, possibly related to the right ovarian parenchyma. GI PCR showed EAEC, EPEC, and cryptosporidium  possible cause  of diarrhea this time . Pain well controlled with pain regimen. Will sent cipro and nitazoxamide x3 days   for  ecoli and cryptosporidium .  Found to be COVID-19 positive, saturating well on RA. pt advance to reg diet if tolreat well dc plan .fu up pcp or wally quintanilla in 1wk.      ---  PATIENT CONDITION:  - stable    ---  PHYSICAL EXAM ON DAY OF DISCHARGE: physical exam 8/14/22 Vital Signs Last 24 Hrs  T(C): 36.7 (14 Aug 2022 05:28), Max: 36.7 (13 Aug 2022 20:54)  T(F): 98.1 (14 Aug 2022 05:28), Max: 98.1 (14 Aug 2022 05:28)  HR: 78 (14 Aug 2022 05:28) (61 - 78)  BP: 122/76 (14 Aug 2022 05:28) (108/68 - 122/76)  BP(mean): --  RR: 16 (14 Aug 2022 05:28) (16 - 16)  SpO2: 97% (14 Aug 2022 05:28) (97% - 98%)    Parameters below as of 14 Aug 2022 05:28  Patient On (Oxygen Delivery Method): room air      GEN no distress , HEENT nt/nc , perrla , CVS s1s2 no tachy , CHEST bl air entery  no wheezing , no rale  , CNS aao/3  sensory /motor intact , GI soft , bs present  nontender  ,  intact , EXT no edema, pedal pulse present , SKIN no rash.      ---  CONSULTANTS:   WALLY, Dr. Atwood     ---  TIME SPENT:  I, the attending physician, was physically present for the key portions of the evaluation and management (E/M) service provided. The total amount of time spent reviewing the hospital notes, laboratory values, imaging findings, assessing/counseling the patient, discussing with consultant physicians, social work, nursing staff was 45 minutes

## 2022-08-14 NOTE — PROGRESS NOTE ADULT - SUBJECTIVE AND OBJECTIVE BOX
Patient is a 27y old  Female who presents with a chief complaint of Acute Proctitis (14 Aug 2022 12:24)    ----  INTERVAL HPI/OVERNIGHT EVENTS: Pt seen and evaluated at the bedside. Patient mentioned persistent diarrhea and mild to moderate abdominal pain. Tolerating well full liquid diet     ----  PAST MEDICAL & SURGICAL HISTORY:      FAMILY HISTORY:      Allergies    No Known Allergies    Intolerances        ----  REVIEW OF SYSTEMS:  CONSTITUTIONAL: denies fever, chills, fatigue, weakness  CARDIOVASCULAR: denies chest pain, chest pressure, palpitations  RESPIRATORY: denies shortness of breath, sputum production  GASTROINTESTINAL: admits nausea, diarrhea, abdominal pain  GENITOURINARY: denies dysuria, discharge  NEUROLOGICAL: denies numbness, headache, focal weakness  MUSCULOSKELETAL: denies new joint pain, muscle aches  HEMATOLOGIC: denies gross bleeding, bruising  LYMPHATICS: denies enlarged lymph nodes, extremity swelling  PSYCHIATRIC: denies recent changes in anxiety, depression  ENDOCRINOLOGIC: denies sweating, cold or heat intolerance    ----  PHYSICAL EXAM:  GENERAL: patient appears well, no acute distress, appropriately interactive  LUNGS: good air entry bilaterally, clear to auscultation, no wheezing or rhonchi appreciated  HEART: S1/S2, regular rate and rhythm, no murmurs noted, no noted edema to b/l LE  GASTROINTESTINAL: abdomen is soft, nontender, nondistended, normoactive bowel sounds, no palpable masses  INTEGUMENT: good skin turgor, appropriate for ethnicity, appears well perfused, no jaundice noted  MUSCULOSKELETAL: no clubbing or cyanosis, no obvious deformity  NEUROLOGIC: awake, alert, oriented x3, strength 5/5 UE and LE , no sensory deficits  PSYCHIATRIC: mood is good, affect is congruent with mood, linear and logical thought process      T(C): 36.7 (22 @ 05:28), Max: 36.7 (22 @ 20:54)  HR: 78 (22 @ 05:28) (61 - 78)  BP: 122/76 (22 @ 05:28) (108/68 - 122/76)  RR: 16 (22 @ 05:28) (16 - 16)  SpO2: 97% (22 @ 05:28) (97% - 98%)  Wt(kg): --    ----  I&O's Summary    13 Aug 2022 07:01  -  14 Aug 2022 07:00  --------------------------------------------------------  IN: 900 mL / OUT: 0 mL / NET: 900 mL        LABS:                        11.8   7.67  )-----------( 275      ( 12 Aug 2022 13:10 )             35.4     08-12    139  |  109<H>  |  12  ----------------------------<  104<H>  3.4<L>   |  22  |  0.67    Ca    8.9      12 Aug 2022 13:10    TPro  8.2  /  Alb  3.7  /  TBili  0.9  /  DBili  x   /  AST  16  /  ALT  17  /  AlkPhos  60  08-12      Urinalysis Basic - ( 12 Aug 2022 20:10 )    Color: Yellow / Appearance: Clear / S.015 / pH: x  Gluc: x / Ketone: Large  / Bili: Small / Urobili: Negative   Blood: x / Protein: 15 / Nitrite: Negative   Leuk Esterase: Trace / RBC: 0-2 /HPF / WBC 6-10   Sq Epi: x / Non Sq Epi: Moderate / Bacteria: x      CAPILLARY BLOOD GLUCOSE          08-13 @ 09:50   Enteroaggregative E. coli (EAEC)  and  Enteropathogenic E. coli (EPEC)  and  Cryptosporidium species  DETECTED by PCR  *******Please Note:*******  GI panel PCR evaluates for:  Campylobacter, Plesiomonas shigelloides, Salmonella,  Vibrio, Yersinia enterocolitica, Enteroaggregative  Escherichia coli (EAEC), Enteropathogenic E.coli (EPEC),  Enterotoxigenic E. coli (ETEC) lt/st, Shiga-like  toxin-producing E. coli (STEC) stx1/stx2,  Shigella/ Enteroinvasive E. coli (EIEC), Cryptosporidium,  Cyclospora cayetanensis, Entamoeba histolytica,  Giardia lamblia, Adenovirus F 40/41, Astrovirus,  Norovirus GI/GII, Rotavirus A, Sapovirus  --  --   @ 20:10   >=3 organisms. Probable collection contamination.  --  --   @ 13:10   No growth to date.  --  --   @ 13:05   No growth to date.  --  --            ----  Personally reviewed:  Vital sign trends: [  ] yes    [  ] no     [  ] n/a  Laboratory results: [  ] yes    [  ] no     [  ] n/a  Radiology results: [  ] yes    [  ] no     [  ] n/a  Culture results: [  ] yes    [  ] no     [  ] n/a  Consultant recommendations: [  ] yes    [  ] no     [  ] n/a

## 2022-08-14 NOTE — DISCHARGE NOTE NURSING/CASE MANAGEMENT/SOCIAL WORK - NSDCVIVACCINE_GEN_ALL_CORE_FT
Tdap; 21-Apr-2020 00:18; Pearl Leon (RN); [employer]; [id number] (Exp. Date: 19-Mar-2022); IntraMuscular; Deltoid Left.; 0.5 milliLiter(s); VIS (VIS Published: 09-May-2013, VIS Presented: 21-Apr-2020);

## 2022-08-14 NOTE — PROGRESS NOTE ADULT - TIME BILLING
pt seen and examine today see above plan . diarrhea sec to ecoli/ cryptosporidium - if pt tolerate reg diet dc plan.

## 2022-08-14 NOTE — DISCHARGE NOTE NURSING/CASE MANAGEMENT/SOCIAL WORK - PATIENT PORTAL LINK FT
You can access the FollowMyHealth Patient Portal offered by [facility] by registering at the following website: [email or web address]. By joining [facility]’s FollowMyHealth portal, you will also be able to view your health information using other applications (apps) compatible with our system.

## 2022-08-14 NOTE — DISCHARGE NOTE NURSING/CASE MANAGEMENT/SOCIAL WORK - NSDCPEFALRISK_GEN_ALL_CORE
For information on Fall & Injury Prevention, visit: [email or web address] OR  [email or web address] OR  https://www.cdc.gov/steadi/patient.html

## 2022-08-14 NOTE — DISCHARGE NOTE PROVIDER - CARE PROVIDER_API CALL
Henrique Ellington (DO)  Gastroenterology; Internal Medicine  [address] [place] [address]  Phone: [phone]  Fax: [phone]  Follow Up Time:

## 2022-08-14 NOTE — DISCHARGE NOTE PROVIDER - NSDCCPCAREPLAN_GEN_ALL_CORE_FT
PRINCIPAL DISCHARGE DIAGNOSIS  Diagnosis: Gastroenteritis  Assessment and Plan of Treatment: Your were found to have a gastroenteritis secondary to EAEC, EPEC and cryptosporidium  Take ciprofloxacin one table every 12hours and nitazoxanide 1 tablet every 12hours for 3 days  You must drink a lot fluids to avid dehydration  Follow up with your primary care doctor within one week of hospital discharge.      SECONDARY DISCHARGE DIAGNOSES  Diagnosis: 2019 novel coronavirus disease (COVID-19)  Assessment and Plan of Treatment: You were found to have COVID-19 infection. Your oxygen level was within normal limits.   You must quarentine for 3 more days.   Follow up with your primary care doctor within one week of hospital discharge.

## 2022-08-14 NOTE — PROGRESS NOTE ADULT - PROBLEM SELECTOR PLAN 1
GI PCR showed positive for EAEC, EPEC, and cryptosporidium.   - Given fever and persistent symptoms will start Cipro 500mg BID for 3 days   - Repeat CT a/p showed no evidence for mechanical bowel obstruction. No colonic wall thickening. Appendix unremarkable. Soft tissue prominence right adnexa, possibly related to the right ovarian parenchyma.  - Pain regimen: Tylenol q6h PRN for mild pain and Toradol 25 mg q6h and 50 mg q6h PRN for moderate and severe pain  - Diet advance to regular diet   - Zofran PRN for nausea/vomiting   - GI, Dr. Ellington following  - dc planning this PM if tolerate regular diet

## 2022-08-14 NOTE — DISCHARGE NOTE PROVIDER - NSDCMRMEDTOKEN_GEN_ALL_CORE_FT
Cipro 500 mg oral tablet: 1 tab(s) orally 2 times a day   dicyclomine 20 mg oral tablet: 1 tab(s) orally 4 times a day   Nitazoxanide 500 mg oral tablet: 1 tab(s) orally 2 times a day   Protonix 40 mg oral delayed release tablet: 1 tab(s) orally once a day

## 2022-08-14 NOTE — PROGRESS NOTE ADULT - SUBJECTIVE AND OBJECTIVE BOX
[facility]  Pravin Argueta PA-C  [address] [place] [id number] [phone]      INTERVAL HPI/OVERNIGHT EVENTS:  Pt s/e  Reports still having diarrhea  Afebrile overnight  Reports no further vomiting. Nausea improving. Agreeable to try regular diet.    MEDICATIONS  (STANDING):  melatonin 5 milliGRAM(s) Oral at bedtime  sodium chloride 0.9%. 1000 milliLiter(s) (115 mL/Hr) IV Continuous <Continuous>    MEDICATIONS  (PRN):  acetaminophen     Tablet .. 650 milliGRAM(s) Oral every 6 hours PRN Temp greater or equal to 38C (100.4F), Mild Pain (1 - 3)  ketorolac   Injectable 15 milliGRAM(s) IV Push every 6 hours PRN Moderate Pain (4 - 6)  ketorolac   Injectable 30 milliGRAM(s) IV Push every 6 hours PRN Severe Pain (7 - 10)  ondansetron Injectable 4 milliGRAM(s) IV Push every 8 hours PRN Nausea and/or Vomiting      Allergies  No Known Allergies    PHYSICAL EXAM:   Vital Signs:  Vital Signs Last 24 Hrs  T(C): 36.7 (14 Aug 2022 05:28), Max: 36.7 (13 Aug 2022 12:17)  T(F): 98.1 (14 Aug 2022 05:28), Max: 98.1 (13 Aug 2022 12:17)  HR: 78 (14 Aug 2022 05:28) (61 - 82)  BP: 122/76 (14 Aug 2022 05:28) (108/68 - 123/75)  BP(mean): --  RR: 16 (14 Aug 2022 05:28) (16 - 16)  SpO2: 97% (14 Aug 2022 05:28) (97% - 100%)    Parameters below as of 14 Aug 2022 05:28  Patient On (Oxygen Delivery Method): room air    GENERAL:  Appears stated age  ABDOMEN:  Soft, non-tender, non-distended  NEURO:  Alert      LABS:                        11.8   7.67  )-----------( 275      ( 12 Aug 2022 13:10 )             35.4     08-12    139  |  109<H>  |  12  ----------------------------<  104<H>  3.4<L>   |  22  |  0.67    Ca    8.9      12 Aug 2022 13:10    TPro  8.2  /  Alb  3.7  /  TBili  0.9  /  DBili  x   /  AST  16  /  ALT  17  /  AlkPhos  60        Urinalysis Basic - ( 12 Aug 2022 20:10 )    Color: Yellow / Appearance: Clear / S.015 / pH: x  Gluc: x / Ketone: Large  / Bili: Small / Urobili: Negative   Blood: x / Protein: 15 / Nitrite: Negative   Leuk Esterase: Trace / RBC: 0-2 /HPF / WBC 6-10   Sq Epi: x / Non Sq Epi: Moderate / Bacteria: x    GI PCR Panel, Stool (22 @ 09:50)    Specimen Source: .Stool Feces    Culture Results:   Enteroaggregative E. coli (EAEC)  and  Enteropathogenic E. coli (EPEC)  and  Cryptosporidium species  DETECTED by PCR  *******Please Note:*******  GI panel PCR evaluates for:  Campylobacter, Plesiomonas shigelloides, Salmonella,  Vibrio, Yersinia enterocolitica, Enteroaggregative  Escherichia coli (EAEC), Enteropathogenic E.coli (EPEC),  Enterotoxigenic E. coli (ETEC) lt/st, Shiga-like  toxin-producing E. coli (STEC) stx1/stx2,  Shigella/ Enteroinvasive E. coli (EIEC), Cryptosporidium,  Cyclospora cayetanensis, Entamoeba histolytica,  Giardia lamblia, Adenovirus F 40/41, Astrovirus,  Norovirus GI/GII, Rotavirus A, Sapovirus

## 2022-08-15 LAB
N GONORRHOEA RRNA SPEC QL NAA+PROBE: SIGNIFICANT CHANGE UP
SPECIMEN SOURCE: SIGNIFICANT CHANGE UP

## 2022-08-16 NOTE — CHART NOTE - NSCHARTNOTEFT_GEN_A_CORE
Called by lab as patients Stool Culture came back positive.     Patient ID confirmed with name and . Patient called and informed of result. She is taking her Ciprofloxacin and has 5 pills left. The Ciprofloxacin will cover the E. Coli.     For her cryptospordium, anti-microbial therapy is only reserved for those with severe and/or persistent GI symptoms.   Patients diarrhea is resolved. No indication for nitazoxanide at this time.     She was instructed to f/u with her PCP next week.

## 2022-09-27 ENCOUNTER — NON-APPOINTMENT (OUTPATIENT)
Age: 27
End: 2022-09-27

## 2022-10-25 ENCOUNTER — NON-APPOINTMENT (OUTPATIENT)
Age: 27
End: 2022-10-25

## 2023-01-26 ENCOUNTER — NON-APPOINTMENT (OUTPATIENT)
Age: 28
End: 2023-01-26

## 2023-02-14 ENCOUNTER — NON-APPOINTMENT (OUTPATIENT)
Age: 28
End: 2023-02-14

## 2023-03-10 ENCOUNTER — NON-APPOINTMENT (OUTPATIENT)
Age: 28
End: 2023-03-10

## 2023-04-07 NOTE — ED ADULT NURSE NOTE - NS TRANSFER RESPONSE BELONGING
Mary Tesfaye is a 55 year old female presenting in clinic today for TCM with no new complaints.     Medications verified, no changes. Pharmacy verified.     Allergies verified. Denies known Latex allergy or symptoms of Latex sensitivity.    Social History     Tobacco Use   Smoking Status Never   Smokeless Tobacco Never       Health Maintenance Due   Topic Date Due   • Hepatitis B Vaccine (1 of 3 - 3-dose series) Never done   • Colorectal Cancer Screen-  Never done   • COVID-19 Vaccine (3 - Booster for Deisy series) 03/21/2022   • DTaP/Tdap/Td Vaccine (3 - Td or Tdap) 10/18/2022   • Breast Cancer Screening  03/14/2023       Patient is due for topics as listed above but is not proceeding with Immunization(s) COVID-19, Dtap/Tdap/Td and Hep B at this time.           Patient would like communication of their results via:        Cell Phone:   Telephone Information:   Mobile [phone]     Okay to leave a message containing results? Yes         yes

## 2023-05-01 ENCOUNTER — NON-APPOINTMENT (OUTPATIENT)
Age: 28
End: 2023-05-01

## 2023-05-08 ENCOUNTER — NON-APPOINTMENT (OUTPATIENT)
Age: 28
End: 2023-05-08

## 2023-05-31 ENCOUNTER — NON-APPOINTMENT (OUTPATIENT)
Age: 28
End: 2023-05-31

## 2023-06-13 ENCOUNTER — APPOINTMENT (OUTPATIENT)
Dept: OBGYN | Facility: CLINIC | Age: 28
End: 2023-06-13

## 2023-08-14 ENCOUNTER — APPOINTMENT (OUTPATIENT)
Dept: OBGYN | Facility: CLINIC | Age: 28
End: 2023-08-14

## 2023-08-16 ENCOUNTER — APPOINTMENT (OUTPATIENT)
Dept: OBGYN | Facility: CLINIC | Age: 28
End: 2023-08-16
Payer: COMMERCIAL

## 2023-08-16 ENCOUNTER — ASOB RESULT (OUTPATIENT)
Age: 28
End: 2023-08-16

## 2023-08-16 VITALS
BODY MASS INDEX: 29.09 KG/M2 | WEIGHT: 181 LBS | SYSTOLIC BLOOD PRESSURE: 123 MMHG | DIASTOLIC BLOOD PRESSURE: 76 MMHG | HEIGHT: 66 IN

## 2023-08-16 DIAGNOSIS — Z34.90 ENCOUNTER FOR SUPERVISION OF NORMAL PREGNANCY, UNSPECIFIED, UNSPECIFIED TRIMESTER: ICD-10-CM

## 2023-08-16 PROCEDURE — 0500F INITIAL PRENATAL CARE VISIT: CPT

## 2023-08-16 PROCEDURE — 36415 COLL VENOUS BLD VENIPUNCTURE: CPT

## 2023-08-16 PROCEDURE — 76817 TRANSVAGINAL US OBSTETRIC: CPT

## 2023-08-16 RX ORDER — ASCORBIC ACID, CALCIUM CITRATE, IRON, VITAMIN D, DL- ALPHA- TOCOPHEROL ACETATE, THIAMINE, RIBOFLAVIN, NIACINAMIDE, PYRIDOXINE HYDROCHLORIDE, FOLIC ACID, IODINE, ZINC, COPPER, DOCUSATE SODIUM, DOCONEXENT AND ICOSAPENT
35-1 & 300 KIT DAILY
Qty: 60 | Refills: 2 | Status: ACTIVE | COMMUNITY
Start: 2023-08-16 | End: 1900-01-01

## 2023-08-16 RX ORDER — DOXYLAMINE SUCCINATE AND PYRIDOXINE HYDROCHLORIDE 20; 20 MG/1; MG/1
20-20 TABLET, EXTENDED RELEASE ORAL
Qty: 30 | Refills: 1 | Status: ACTIVE | COMMUNITY
Start: 2023-08-16 | End: 1900-01-01

## 2023-08-16 RX ORDER — DOXYLAMINE SUCCINATE AND PYRIDOXINE HYDROCHLORIDE 10; 10 MG/1; MG/1
10-10 TABLET, DELAYED RELEASE ORAL
Qty: 60 | Refills: 0 | Status: ACTIVE | COMMUNITY
Start: 2023-08-16 | End: 1900-01-01

## 2023-08-16 RX ORDER — ASCORBIC ACID, CHOLECALCIFEROL, .ALPHA.-TOCOPHEROL ACETATE, DL-, PYRIDOXINE, FOLIC ACID, CYANOCOBALAMIN, CALCIUM, FERROUS FUMARATE, MAGNESIUM, DOCONEXENT 85; 200; 10; 25; 1; 12; 140; 27; 45; 300 [IU]/1; [IU]/1; [IU]/1; [IU]/1; MG/1; UG/1; MG/1; MG/1; MG/1; MG/1
27-0.6-0.4-3 CAPSULE, GELATIN COATED ORAL
Qty: 3 | Refills: 1 | Status: ACTIVE | COMMUNITY
Start: 2023-08-16 | End: 1900-01-01

## 2023-08-17 LAB
BACTERIA UR CULT: NORMAL
C TRACH RRNA SPEC QL NAA+PROBE: NOT DETECTED
N GONORRHOEA RRNA SPEC QL NAA+PROBE: NOT DETECTED
SOURCE AMPLIFICATION: NORMAL

## 2023-08-28 ENCOUNTER — NON-APPOINTMENT (OUTPATIENT)
Age: 28
End: 2023-08-28

## 2023-08-28 ENCOUNTER — APPOINTMENT (OUTPATIENT)
Dept: OBGYN | Facility: CLINIC | Age: 28
End: 2023-08-28
Payer: COMMERCIAL

## 2023-08-28 ENCOUNTER — APPOINTMENT (OUTPATIENT)
Dept: OBGYN | Facility: CLINIC | Age: 28
End: 2023-08-28

## 2023-08-28 VITALS — DIASTOLIC BLOOD PRESSURE: 84 MMHG | SYSTOLIC BLOOD PRESSURE: 131 MMHG | BODY MASS INDEX: 29.21 KG/M2 | WEIGHT: 181 LBS

## 2023-08-28 DIAGNOSIS — Z3A.10 10 WEEKS GESTATION OF PREGNANCY: ICD-10-CM

## 2023-08-28 DIAGNOSIS — Z87.898 PERSONAL HISTORY OF OTHER SPECIFIED CONDITIONS: ICD-10-CM

## 2023-08-28 PROCEDURE — 0502F SUBSEQUENT PRENATAL CARE: CPT

## 2023-08-28 PROCEDURE — 36415 COLL VENOUS BLD VENIPUNCTURE: CPT

## 2023-08-29 LAB
ABO + RH PNL BLD: NORMAL
ALBUMIN SERPL ELPH-MCNC: 4.2 G/DL
ALP BLD-CCNC: 65 U/L
ALT SERPL-CCNC: 13 U/L
ANION GAP SERPL CALC-SCNC: 18 MMOL/L
AST SERPL-CCNC: 17 U/L
BILIRUB SERPL-MCNC: 0.5 MG/DL
BLD GP AB SCN SERPL QL: NORMAL
BUN SERPL-MCNC: 10 MG/DL
CALCIUM SERPL-MCNC: 9.3 MG/DL
CHLORIDE SERPL-SCNC: 100 MMOL/L
CO2 SERPL-SCNC: 20 MMOL/L
CREAT SERPL-MCNC: 0.52 MG/DL
EGFR: 130 ML/MIN/1.73M2
GLUCOSE SERPL-MCNC: 41 MG/DL
HBV SURFACE AG SER QL: NONREACTIVE
HGB A MFR BLD: 97.3 %
HGB A2 MFR BLD: 2.7 %
HGB FRACT BLD-IMP: NORMAL
HIV1+2 AB SPEC QL IA.RAPID: NONREACTIVE
MEV IGG FLD QL IA: >300 AU/ML
MEV IGG+IGM SER-IMP: POSITIVE
POTASSIUM SERPL-SCNC: 3.6 MMOL/L
PROT SERPL-MCNC: 7.1 G/DL
RUBV IGG FLD-ACNC: 9.2 INDEX
RUBV IGG SER-IMP: POSITIVE
SODIUM SERPL-SCNC: 137 MMOL/L
T GONDII AB SER-IMP: NEGATIVE
T GONDII AB SER-IMP: NEGATIVE
T GONDII IGG SER QL: <3 IU/ML
T GONDII IGM SER QL: <3 AU/ML
T PALLIDUM AB SER QL IA: NEGATIVE
T4 FREE SERPL-MCNC: 1.5 NG/DL
TSH SERPL-ACNC: 1.19 UIU/ML
URATE SERPL-MCNC: 4 MG/DL
VZV AB TITR SER: POSITIVE
VZV IGG SER IF-ACNC: 349.8 INDEX

## 2023-08-30 LAB — LEAD BLD-MCNC: <1 UG/DL

## 2023-09-01 LAB
B19V IGG SER QL IA: 5.29 INDEX
B19V IGG+IGM SER-IMP: NORMAL
B19V IGG+IGM SER-IMP: POSITIVE
B19V IGM FLD-ACNC: 0.14 INDEX
B19V IGM SER-ACNC: NEGATIVE

## 2023-09-05 ENCOUNTER — NON-APPOINTMENT (OUTPATIENT)
Age: 28
End: 2023-09-05

## 2023-09-08 ENCOUNTER — NON-APPOINTMENT (OUTPATIENT)
Age: 28
End: 2023-09-08

## 2023-09-12 ENCOUNTER — ASOB RESULT (OUTPATIENT)
Age: 28
End: 2023-09-12

## 2023-09-12 ENCOUNTER — APPOINTMENT (OUTPATIENT)
Dept: OBGYN | Facility: CLINIC | Age: 28
End: 2023-09-12
Payer: COMMERCIAL

## 2023-09-12 VITALS — DIASTOLIC BLOOD PRESSURE: 80 MMHG | WEIGHT: 178 LBS | BODY MASS INDEX: 28.73 KG/M2 | SYSTOLIC BLOOD PRESSURE: 122 MMHG

## 2023-09-12 DIAGNOSIS — Z3A.12 12 WEEKS GESTATION OF PREGNANCY: ICD-10-CM

## 2023-09-12 PROCEDURE — 0502F SUBSEQUENT PRENATAL CARE: CPT

## 2023-09-12 PROCEDURE — 76813 OB US NUCHAL MEAS 1 GEST: CPT

## 2023-09-28 LAB — MEV IGM SER QL: NEGATIVE

## 2023-10-09 ENCOUNTER — APPOINTMENT (OUTPATIENT)
Dept: OBGYN | Facility: CLINIC | Age: 28
End: 2023-10-09
Payer: COMMERCIAL

## 2023-10-09 ENCOUNTER — ASOB RESULT (OUTPATIENT)
Age: 28
End: 2023-10-09

## 2023-10-09 VITALS
HEIGHT: 66 IN | SYSTOLIC BLOOD PRESSURE: 125 MMHG | WEIGHT: 180 LBS | DIASTOLIC BLOOD PRESSURE: 77 MMHG | BODY MASS INDEX: 28.93 KG/M2

## 2023-10-09 DIAGNOSIS — Z3A.16 16 WEEKS GESTATION OF PREGNANCY: ICD-10-CM

## 2023-10-09 PROCEDURE — 36415 COLL VENOUS BLD VENIPUNCTURE: CPT

## 2023-10-09 PROCEDURE — 0502F SUBSEQUENT PRENATAL CARE: CPT

## 2023-10-09 PROCEDURE — 76815 OB US LIMITED FETUS(S): CPT

## 2023-10-10 ENCOUNTER — APPOINTMENT (OUTPATIENT)
Dept: OBGYN | Facility: CLINIC | Age: 28
End: 2023-10-10

## 2023-10-10 LAB — GLUCOSE 1H P 50 G GLC PO SERPL-MCNC: 105 MG/DL

## 2023-10-12 LAB
AFP MOM: 0.46
AFP VALUE: 14.5 NG/ML
ALPHA FETOPROTEIN SERUM COMMENT: NORMAL
ALPHA FETOPROTEIN SERUM INTERPRETATION: NORMAL
ALPHA FETOPROTEIN SERUM RESULTS: NORMAL
ALPHA FETOPROTEIN SERUM TEST RESULTS: NORMAL
GESTATIONAL AGE BASED ON: NORMAL
GESTATIONAL AGE ON COLLECTION DATE: 16.3 WEEKS
INSULIN DEP DIABETES: NO
MATERNAL AGE AT EDD AFP: 28.7 YR
MULTIPLE GESTATION: NO
OSBR RISK 1 IN: NORMAL
RACE: NORMAL
WEIGHT AFP: 180 LBS

## 2023-10-19 ENCOUNTER — NON-APPOINTMENT (OUTPATIENT)
Age: 28
End: 2023-10-19

## 2023-11-03 ENCOUNTER — ASOB RESULT (OUTPATIENT)
Age: 28
End: 2023-11-03

## 2023-11-03 ENCOUNTER — APPOINTMENT (OUTPATIENT)
Dept: OBGYN | Facility: CLINIC | Age: 28
End: 2023-11-03
Payer: COMMERCIAL

## 2023-11-03 VITALS — BODY MASS INDEX: 29.05 KG/M2 | SYSTOLIC BLOOD PRESSURE: 129 MMHG | WEIGHT: 180 LBS | DIASTOLIC BLOOD PRESSURE: 79 MMHG

## 2023-11-03 DIAGNOSIS — Z3A.20 20 WEEKS GESTATION OF PREGNANCY: ICD-10-CM

## 2023-11-03 PROCEDURE — 76805 OB US >/= 14 WKS SNGL FETUS: CPT

## 2023-11-03 PROCEDURE — 0502F SUBSEQUENT PRENATAL CARE: CPT

## 2023-11-06 ENCOUNTER — NON-APPOINTMENT (OUTPATIENT)
Age: 28
End: 2023-11-06

## 2023-11-14 ENCOUNTER — NON-APPOINTMENT (OUTPATIENT)
Age: 28
End: 2023-11-14

## 2023-11-20 ENCOUNTER — NON-APPOINTMENT (OUTPATIENT)
Age: 28
End: 2023-11-20

## 2023-11-21 ENCOUNTER — NON-APPOINTMENT (OUTPATIENT)
Age: 28
End: 2023-11-21

## 2023-11-21 ENCOUNTER — OUTPATIENT (OUTPATIENT)
Dept: OUTPATIENT SERVICES | Facility: HOSPITAL | Age: 28
LOS: 1 days | End: 2023-11-21
Payer: COMMERCIAL

## 2023-11-21 VITALS — SYSTOLIC BLOOD PRESSURE: 124 MMHG | DIASTOLIC BLOOD PRESSURE: 60 MMHG | HEART RATE: 93 BPM

## 2023-11-21 VITALS — OXYGEN SATURATION: 99 % | HEART RATE: 110 BPM

## 2023-11-21 DIAGNOSIS — O26.899 OTHER SPECIFIED PREGNANCY RELATED CONDITIONS, UNSPECIFIED TRIMESTER: ICD-10-CM

## 2023-11-21 LAB
APPEARANCE UR: CLEAR — SIGNIFICANT CHANGE UP
APPEARANCE UR: CLEAR — SIGNIFICANT CHANGE UP
BACTERIA # UR AUTO: NEGATIVE /HPF — SIGNIFICANT CHANGE UP
BACTERIA # UR AUTO: NEGATIVE /HPF — SIGNIFICANT CHANGE UP
BILIRUB UR-MCNC: NEGATIVE — SIGNIFICANT CHANGE UP
BILIRUB UR-MCNC: NEGATIVE — SIGNIFICANT CHANGE UP
CAST: 0 /LPF — SIGNIFICANT CHANGE UP (ref 0–4)
CAST: 0 /LPF — SIGNIFICANT CHANGE UP (ref 0–4)
COLOR SPEC: YELLOW — SIGNIFICANT CHANGE UP
COLOR SPEC: YELLOW — SIGNIFICANT CHANGE UP
DIFF PNL FLD: NEGATIVE — SIGNIFICANT CHANGE UP
DIFF PNL FLD: NEGATIVE — SIGNIFICANT CHANGE UP
GLUCOSE UR QL: NEGATIVE MG/DL — SIGNIFICANT CHANGE UP
GLUCOSE UR QL: NEGATIVE MG/DL — SIGNIFICANT CHANGE UP
KETONES UR-MCNC: NEGATIVE MG/DL — SIGNIFICANT CHANGE UP
KETONES UR-MCNC: NEGATIVE MG/DL — SIGNIFICANT CHANGE UP
LEUKOCYTE ESTERASE UR-ACNC: ABNORMAL
LEUKOCYTE ESTERASE UR-ACNC: ABNORMAL
NITRITE UR-MCNC: NEGATIVE — SIGNIFICANT CHANGE UP
NITRITE UR-MCNC: NEGATIVE — SIGNIFICANT CHANGE UP
PH UR: 7 — SIGNIFICANT CHANGE UP (ref 5–8)
PH UR: 7 — SIGNIFICANT CHANGE UP (ref 5–8)
PROT UR-MCNC: NEGATIVE MG/DL — SIGNIFICANT CHANGE UP
PROT UR-MCNC: NEGATIVE MG/DL — SIGNIFICANT CHANGE UP
RBC CASTS # UR COMP ASSIST: 1 /HPF — SIGNIFICANT CHANGE UP (ref 0–4)
RBC CASTS # UR COMP ASSIST: 1 /HPF — SIGNIFICANT CHANGE UP (ref 0–4)
SP GR SPEC: <1.005 — LOW (ref 1–1.03)
SP GR SPEC: <1.005 — LOW (ref 1–1.03)
SQUAMOUS # UR AUTO: 1 /HPF — SIGNIFICANT CHANGE UP (ref 0–5)
SQUAMOUS # UR AUTO: 1 /HPF — SIGNIFICANT CHANGE UP (ref 0–5)
UROBILINOGEN FLD QL: 0.2 MG/DL — SIGNIFICANT CHANGE UP (ref 0.2–1)
UROBILINOGEN FLD QL: 0.2 MG/DL — SIGNIFICANT CHANGE UP (ref 0.2–1)
WBC UR QL: 2 /HPF — SIGNIFICANT CHANGE UP (ref 0–5)
WBC UR QL: 2 /HPF — SIGNIFICANT CHANGE UP (ref 0–5)

## 2023-11-21 PROCEDURE — 81001 URINALYSIS AUTO W/SCOPE: CPT

## 2023-11-21 PROCEDURE — 87086 URINE CULTURE/COLONY COUNT: CPT

## 2023-11-21 PROCEDURE — G0463: CPT

## 2023-11-21 NOTE — OB PROVIDER TRIAGE NOTE - NSOBPROVIDERNOTE_OBGYN_ALL_OB_FT
29yo  @22+3 presenting with an episode of scant red blood this morning. Patient does not have further vaginal bleeding on speculum exam and a normal CL of 3.69. VB likely from irritated cervical ectropion.    - urinalysis unremarkable: trace LE with negative nitrite  - f/u Urine Cx ()  - B pos, rhogam not indicated  - Recommend pelvic rest    d/w Dr. Ivy Mccartney, PGY3

## 2023-11-21 NOTE — OB PROVIDER TRIAGE NOTE - NS_FINALEDD_OBGYN_ALL_OB_DT
23-Mar-2024 Nephrology progress note    THIS IS AN INCOMPLETE NOTE . FULL NOTE TO FOLLOW SHORTLY    Patient is seen and examined, events over the last 24 h noted .    Allergies:  aspirin (Rash)  latex (Unknown)  penicillins (Unknown)    Hospital Medications:   MEDICATIONS  (STANDING):  metoprolol tartrate 25 milliGRAM(s) Oral two times a day  pantoprazole  Injectable 40 milliGRAM(s) IV Push two times a day  polyethylene glycol 3350 17 Gram(s) Oral daily  senna 2 Tablet(s) Oral at bedtime        VITALS:  T(F): 98.8 (02-21-22 @ 05:21), Max: 98.8 (02-21-22 @ 05:21)  HR: 77 (02-21-22 @ 05:21)  BP: 132/61 (02-21-22 @ 05:21)  RR: 17 (02-21-22 @ 05:21)  SpO2: 96% (02-21-22 @ 05:21)  Wt(kg): --    02-19 @ 07:01  -  02-20 @ 07:00  --------------------------------------------------------  IN: 0 mL / OUT: 1400 mL / NET: -1400 mL    02-20 @ 07:01  -  02-21 @ 07:00  --------------------------------------------------------  IN: 779 mL / OUT: 881 mL / NET: -102 mL          PHYSICAL EXAM:  Constitutional: NAD  HEENT: anicteric sclera, oropharynx clear, MMM  Neck: No JVD  Respiratory: CTAB, no wheezes, rales or rhonchi  Cardiovascular: S1, S2, RRR  Gastrointestinal: BS+, soft, NT/ND  Extremities: No cyanosis or clubbing. No peripheral edema  :  No culver.   Skin: No rashes    LABS:  02-21    147<H>  |  112<H>  |  62<HH>  ----------------------------<  99  5.1<H>   |  25  |  3.1<H>  Creatinine Trend: 3.1<--, 3.4<--, 3.9<--, 3.7<--, 3.6<--, 2.9<--  Ca    8.4<L>      21 Feb 2022 05:19  Mg     2.2     02-21    TPro  5.8<L>  /  Alb  3.3<L>  /  TBili  1.2  /  DBili      /  AST  13  /  ALT  6   /  AlkPhos  71  02-21                          9.5    5.74  )-----------( 114      ( 21 Feb 2022 05:19 )             31.0       Urine Studies:    Creatinine, Random Urine: 50 mg/dL (02-19 @ 18:30)  Sodium, Random Urine: 83.0 mmoL/L (02-19 @ 18:30)    RADIOLOGY & ADDITIONAL STUDIES:   Nephrology progress note  Patient is seen and examined, events over the last 24 h noted .  Lying in bed comfortable     Allergies:  aspirin (Rash)  latex (Unknown)  penicillins (Unknown)    Hospital Medications:   MEDICATIONS  (STANDING):  metoprolol tartrate 25 milliGRAM(s) Oral two times a day  pantoprazole  Injectable 40 milliGRAM(s) IV Push two times a day  polyethylene glycol 3350 17 Gram(s) Oral daily  senna 2 Tablet(s) Oral at bedtime        VITALS:  T(F): 98.8 (02-21-22 @ 05:21), Max: 98.8 (02-21-22 @ 05:21)  HR: 77 (02-21-22 @ 05:21)  BP: 132/61 (02-21-22 @ 05:21)  RR: 17 (02-21-22 @ 05:21)  SpO2: 96% (02-21-22 @ 05:21)      02-19 @ 07:01  -  02-20 @ 07:00  --------------------------------------------------------  IN: 0 mL / OUT: 1400 mL / NET: -1400 mL    02-20 @ 07:01  -  02-21 @ 07:00  --------------------------------------------------------  IN: 779 mL / OUT: 881 mL / NET: -102 mL          PHYSICAL EXAM:  Constitutional: NAD  Neck: No JVD  Respiratory: CTAB,   Cardiovascular: S1, S2, RRR  Gastrointestinal: BS+, soft, NT/ND  Extremities: No cyanosis or clubbing. No peripheral edema  :   culver.   Skin: No rashes    LABS:  02-21    147<H>  |  112<H>  |  62<HH>  ----------------------------<  99  5.1<H>   |  25  |  3.1<H>    Creatinine Trend: 3.1<--, 3.4<--, 3.9<--, 3.7<--, 3.6<--, 2.9<--    Ca    8.4<L>      21 Feb 2022 05:19  Mg     2.2     02-21    TPro  5.8<L>  /  Alb  3.3<L>  /  TBili  1.2  /  DBili      /  AST  13  /  ALT  6   /  AlkPhos  71  02-21                          9.5    5.74  )-----------( 114      ( 21 Feb 2022 05:19 )             31.0       Urine Studies:    Creatinine, Random Urine: 50 mg/dL (02-19 @ 18:30)  Sodium, Random Urine: 83.0 mmoL/L (02-19 @ 18:30)    RADIOLOGY & ADDITIONAL STUDIES:

## 2023-11-21 NOTE — OB PROVIDER TRIAGE NOTE - HISTORY OF PRESENT ILLNESS
Pt is a 29y/o  at 22w admitted for vaginal bleeding described as "bright red spotting on toilet paper" with a small clot. She has not contractions, no other episodes of vaginal bleeding, and no loss of fluids. Most recent ultrasound at 11/3 was normal and revealed normal fetal anatomy and no concerning amniotic fluid findings, with a posterior placenta. She denies any abd pain, trauma, falls, and most recent sexual intercourse was 1 week ago.   Prenatal course most notable for periodic brown spotting and discharge. Otherwise unremarkable.     OBHx:   GynHx: denies h/o abnormal paps, STI's, fibroids, cysts, endometriosis, PCOS  PMHx: none  PSHx: none   Med: PNV  All: NKDA  SH: denies alcohol, tobacco, or drug use  Psych: denies h/o anxiety or depression    [place]: no contractions seen   VE:  TAUS:    A&P:   Vaginal bleeding  -r/o  labor  -r/o cervical insufficiency  -r/o uterine rupture  -f/u with OBGYN outpatient for continued monitoring  -Urinalysis for evaluation of occult UTI  -CBC       Pt is a 29y/o  at 22+3wga presenting with bright red vaginal bleeding that she noticed on her underwear this morning and with wiping. Patient showed an image of the bleeding and it is scant with a blood clot the size of a pea. Patient reports episodes of brown blood with wiping for a about a month earlier this pregnancy which resolved spontaneously. She denies abdominal pain, dysuria, vaginal itchiness or burning, loss of fluids. Most recent ultrasound at 11/3 was normal and revealed normal fetal anatomy and no concerning amniotic fluid findings, with a posterior placenta. She denies any abd pain, trauma, falls, and most recent sexual intercourse was 1 week ago.    Prenatal course most notable for periodic brown spotting and discharge. Otherwise unremarkable.     OBHx: primigravida  GynHx: denies h/o abnormal paps, STI's, fibroids, cysts, endometriosis, PCOS  PMHx: none  PSHx: none   Med: PNV  All: NKDA  SH: denies alcohol, tobacco, or drug use  Psych: denies h/o anxiety or depression

## 2023-11-21 NOTE — OB RN TRIAGE NOTE - FALL HARM RISK - UNIVERSAL INTERVENTIONS
Bed in lowest position, wheels locked, appropriate side rails in place/Call bell, personal items and telephone in reach/Instruct patient to call for assistance before getting out of bed or chair/Non-slip footwear when patient is out of bed/[place] to call system/Physically safe environment - no spills, clutter or unnecessary equipment/Purposeful Proactive Rounding/Room/bathroom lighting operational, light cord in reach

## 2023-11-23 DIAGNOSIS — Z3A.22 22 WEEKS GESTATION OF PREGNANCY: ICD-10-CM

## 2023-11-23 DIAGNOSIS — O46.92 ANTEPARTUM HEMORRHAGE, UNSPECIFIED, SECOND TRIMESTER: ICD-10-CM

## 2023-12-05 ENCOUNTER — APPOINTMENT (OUTPATIENT)
Dept: OBGYN | Facility: CLINIC | Age: 28
End: 2023-12-05
Payer: COMMERCIAL

## 2023-12-05 VITALS — BODY MASS INDEX: 29.54 KG/M2 | WEIGHT: 183 LBS | SYSTOLIC BLOOD PRESSURE: 118 MMHG | DIASTOLIC BLOOD PRESSURE: 76 MMHG

## 2023-12-05 DIAGNOSIS — O26.812 PREGNANCY RELATED EXHAUSTION AND FATIGUE, SECOND TRIMESTER: ICD-10-CM

## 2023-12-05 DIAGNOSIS — Z3A.24 24 WEEKS GESTATION OF PREGNANCY: ICD-10-CM

## 2023-12-05 PROBLEM — Z78.9 OTHER SPECIFIED HEALTH STATUS: Chronic | Status: ACTIVE | Noted: 2023-11-21

## 2023-12-05 LAB
BASOPHILS # BLD AUTO: 0.05 K/UL
BASOPHILS NFR BLD AUTO: 0.5 %
EOSINOPHIL # BLD AUTO: 0.09 K/UL
EOSINOPHIL NFR BLD AUTO: 0.8 %
HCT VFR BLD CALC: 37.8 %
HGB BLD-MCNC: 12.1 G/DL
IMM GRANULOCYTES NFR BLD AUTO: 0.7 %
LYMPHOCYTES # BLD AUTO: 1.76 K/UL
LYMPHOCYTES NFR BLD AUTO: 16.4 %
MAN DIFF?: NORMAL
MCHC RBC-ENTMCNC: 25.8 PG
MCHC RBC-ENTMCNC: 32 GM/DL
MCV RBC AUTO: 80.6 FL
MONOCYTES # BLD AUTO: 0.5 K/UL
MONOCYTES NFR BLD AUTO: 4.7 %
NEUTROPHILS # BLD AUTO: 8.26 K/UL
NEUTROPHILS NFR BLD AUTO: 76.9 %
PLATELET # BLD AUTO: 261 K/UL
RBC # BLD: 4.69 M/UL
RBC # FLD: 15.3 %
WBC # FLD AUTO: 10.73 K/UL

## 2023-12-05 PROCEDURE — 0503F POSTPARTUM CARE VISIT: CPT

## 2023-12-05 PROCEDURE — 36415 COLL VENOUS BLD VENIPUNCTURE: CPT

## 2023-12-07 LAB
ALBUMIN SERPL ELPH-MCNC: 4.1 G/DL
ALP BLD-CCNC: 69 U/L
ALT SERPL-CCNC: 10 U/L
ANION GAP SERPL CALC-SCNC: 16 MMOL/L
AST SERPL-CCNC: 12 U/L
BILIRUB SERPL-MCNC: 0.3 MG/DL
BUN SERPL-MCNC: 7 MG/DL
CALCIUM SERPL-MCNC: 9.2 MG/DL
CHLORIDE SERPL-SCNC: 103 MMOL/L
CO2 SERPL-SCNC: 21 MMOL/L
CREAT SERPL-MCNC: 0.49 MG/DL
EGFR: 132 ML/MIN/1.73M2
GLUCOSE SERPL-MCNC: 64 MG/DL
POTASSIUM SERPL-SCNC: 4.1 MMOL/L
PROT SERPL-MCNC: 7.5 G/DL
SODIUM SERPL-SCNC: 140 MMOL/L

## 2023-12-19 ENCOUNTER — NON-APPOINTMENT (OUTPATIENT)
Age: 28
End: 2023-12-19

## 2023-12-25 ENCOUNTER — NON-APPOINTMENT (OUTPATIENT)
Age: 28
End: 2023-12-25

## 2023-12-27 ENCOUNTER — NON-APPOINTMENT (OUTPATIENT)
Age: 28
End: 2023-12-27

## 2023-12-27 ENCOUNTER — OUTPATIENT (OUTPATIENT)
Dept: OUTPATIENT SERVICES | Facility: HOSPITAL | Age: 28
LOS: 1 days | End: 2023-12-27
Payer: COMMERCIAL

## 2023-12-27 ENCOUNTER — APPOINTMENT (OUTPATIENT)
Dept: OBGYN | Facility: CLINIC | Age: 28
End: 2023-12-27

## 2023-12-27 VITALS
DIASTOLIC BLOOD PRESSURE: 74 MMHG | SYSTOLIC BLOOD PRESSURE: 132 MMHG | TEMPERATURE: 100 F | HEART RATE: 110 BPM | RESPIRATION RATE: 18 BRPM

## 2023-12-27 VITALS — HEART RATE: 234 BPM | DIASTOLIC BLOOD PRESSURE: 69 MMHG | SYSTOLIC BLOOD PRESSURE: 118 MMHG | OXYGEN SATURATION: 99 %

## 2023-12-27 DIAGNOSIS — O26.899 OTHER SPECIFIED PREGNANCY RELATED CONDITIONS, UNSPECIFIED TRIMESTER: ICD-10-CM

## 2023-12-27 LAB
APPEARANCE UR: ABNORMAL
APPEARANCE UR: ABNORMAL
BACTERIA # UR AUTO: ABNORMAL /HPF
BACTERIA # UR AUTO: ABNORMAL /HPF
BILIRUB UR-MCNC: NEGATIVE — SIGNIFICANT CHANGE UP
BILIRUB UR-MCNC: NEGATIVE — SIGNIFICANT CHANGE UP
CAST: 1 /LPF — SIGNIFICANT CHANGE UP (ref 0–4)
CAST: 1 /LPF — SIGNIFICANT CHANGE UP (ref 0–4)
COLOR SPEC: YELLOW — SIGNIFICANT CHANGE UP
COLOR SPEC: YELLOW — SIGNIFICANT CHANGE UP
DIFF PNL FLD: NEGATIVE — SIGNIFICANT CHANGE UP
DIFF PNL FLD: NEGATIVE — SIGNIFICANT CHANGE UP
FLUBV RNA SPEC QL NAA+PROBE: DETECTED
FLUBV RNA SPEC QL NAA+PROBE: DETECTED
GLUCOSE UR QL: NEGATIVE MG/DL — SIGNIFICANT CHANGE UP
GLUCOSE UR QL: NEGATIVE MG/DL — SIGNIFICANT CHANGE UP
HCT VFR BLD CALC: 35.1 % — SIGNIFICANT CHANGE UP (ref 34.5–45)
HCT VFR BLD CALC: 35.1 % — SIGNIFICANT CHANGE UP (ref 34.5–45)
HGB BLD-MCNC: 11.4 G/DL — LOW (ref 11.5–15.5)
HGB BLD-MCNC: 11.4 G/DL — LOW (ref 11.5–15.5)
KETONES UR-MCNC: 40 MG/DL
KETONES UR-MCNC: 40 MG/DL
LEUKOCYTE ESTERASE UR-ACNC: ABNORMAL
LEUKOCYTE ESTERASE UR-ACNC: ABNORMAL
MCHC RBC-ENTMCNC: 26.1 PG — LOW (ref 27–34)
MCHC RBC-ENTMCNC: 26.1 PG — LOW (ref 27–34)
MCHC RBC-ENTMCNC: 32.5 GM/DL — SIGNIFICANT CHANGE UP (ref 32–36)
MCHC RBC-ENTMCNC: 32.5 GM/DL — SIGNIFICANT CHANGE UP (ref 32–36)
MCV RBC AUTO: 80.3 FL — SIGNIFICANT CHANGE UP (ref 80–100)
MCV RBC AUTO: 80.3 FL — SIGNIFICANT CHANGE UP (ref 80–100)
NITRITE UR-MCNC: NEGATIVE — SIGNIFICANT CHANGE UP
NITRITE UR-MCNC: NEGATIVE — SIGNIFICANT CHANGE UP
NRBC # BLD: 0 /100 WBCS — SIGNIFICANT CHANGE UP (ref 0–0)
NRBC # BLD: 0 /100 WBCS — SIGNIFICANT CHANGE UP (ref 0–0)
PH UR: 7.5 — SIGNIFICANT CHANGE UP (ref 5–8)
PH UR: 7.5 — SIGNIFICANT CHANGE UP (ref 5–8)
PLATELET # BLD AUTO: 225 K/UL — SIGNIFICANT CHANGE UP (ref 150–400)
PLATELET # BLD AUTO: 225 K/UL — SIGNIFICANT CHANGE UP (ref 150–400)
PROT UR-MCNC: SIGNIFICANT CHANGE UP MG/DL
PROT UR-MCNC: SIGNIFICANT CHANGE UP MG/DL
RAPID RVP RESULT: DETECTED
RAPID RVP RESULT: DETECTED
RBC # BLD: 4.37 M/UL — SIGNIFICANT CHANGE UP (ref 3.8–5.2)
RBC # BLD: 4.37 M/UL — SIGNIFICANT CHANGE UP (ref 3.8–5.2)
RBC # FLD: 14.2 % — SIGNIFICANT CHANGE UP (ref 10.3–14.5)
RBC # FLD: 14.2 % — SIGNIFICANT CHANGE UP (ref 10.3–14.5)
RBC CASTS # UR COMP ASSIST: 7 /HPF — HIGH (ref 0–4)
RBC CASTS # UR COMP ASSIST: 7 /HPF — HIGH (ref 0–4)
REVIEW: SIGNIFICANT CHANGE UP
REVIEW: SIGNIFICANT CHANGE UP
SARS-COV-2 RNA SPEC QL NAA+PROBE: SIGNIFICANT CHANGE UP
SARS-COV-2 RNA SPEC QL NAA+PROBE: SIGNIFICANT CHANGE UP
SP GR SPEC: 1.02 — SIGNIFICANT CHANGE UP (ref 1–1.03)
SP GR SPEC: 1.02 — SIGNIFICANT CHANGE UP (ref 1–1.03)
SQUAMOUS # UR AUTO: 8 /HPF — HIGH (ref 0–5)
SQUAMOUS # UR AUTO: 8 /HPF — HIGH (ref 0–5)
UROBILINOGEN FLD QL: 1 MG/DL — SIGNIFICANT CHANGE UP (ref 0.2–1)
UROBILINOGEN FLD QL: 1 MG/DL — SIGNIFICANT CHANGE UP (ref 0.2–1)
WBC # BLD: 8.68 K/UL — SIGNIFICANT CHANGE UP (ref 3.8–10.5)
WBC # BLD: 8.68 K/UL — SIGNIFICANT CHANGE UP (ref 3.8–10.5)
WBC # FLD AUTO: 8.68 K/UL — SIGNIFICANT CHANGE UP (ref 3.8–10.5)
WBC # FLD AUTO: 8.68 K/UL — SIGNIFICANT CHANGE UP (ref 3.8–10.5)
WBC UR QL: 24 /HPF — HIGH (ref 0–5)
WBC UR QL: 24 /HPF — HIGH (ref 0–5)

## 2023-12-27 PROCEDURE — 81001 URINALYSIS AUTO W/SCOPE: CPT

## 2023-12-27 PROCEDURE — 99221 1ST HOSP IP/OBS SF/LOW 40: CPT

## 2023-12-27 PROCEDURE — G0463: CPT

## 2023-12-27 PROCEDURE — 0225U NFCT DS DNA&RNA 21 SARSCOV2: CPT

## 2023-12-27 PROCEDURE — 87651 STREP A DNA AMP PROBE: CPT

## 2023-12-27 PROCEDURE — 85027 COMPLETE CBC AUTOMATED: CPT

## 2023-12-27 PROCEDURE — 87086 URINE CULTURE/COLONY COUNT: CPT

## 2023-12-27 PROCEDURE — 87798 DETECT AGENT NOS DNA AMP: CPT

## 2023-12-27 PROCEDURE — 59025 FETAL NON-STRESS TEST: CPT

## 2023-12-27 RX ORDER — ACETAMINOPHEN 500 MG
975 TABLET ORAL ONCE
Refills: 0 | Status: COMPLETED | OUTPATIENT
Start: 2023-12-27 | End: 2023-12-27

## 2023-12-27 RX ORDER — BENZOCAINE AND MENTHOL 5; 1 G/100ML; G/100ML
1 LIQUID ORAL ONCE
Refills: 0 | Status: COMPLETED | OUTPATIENT
Start: 2023-12-27 | End: 2023-12-27

## 2023-12-27 RX ORDER — SODIUM CHLORIDE 9 MG/ML
1000 INJECTION, SOLUTION INTRAVENOUS ONCE
Refills: 0 | Status: COMPLETED | OUTPATIENT
Start: 2023-12-27 | End: 2023-12-27

## 2023-12-27 RX ORDER — SODIUM CHLORIDE 0.65 %
1 AEROSOL, SPRAY (ML) NASAL ONCE
Refills: 0 | Status: COMPLETED | OUTPATIENT
Start: 2023-12-27 | End: 2023-12-27

## 2023-12-27 RX ADMIN — Medication 1 SPRAY(S): at 13:12

## 2023-12-27 RX ADMIN — BENZOCAINE AND MENTHOL 1 LOZENGE: 5; 1 LIQUID ORAL at 17:19

## 2023-12-27 RX ADMIN — SODIUM CHLORIDE 1000 MILLILITER(S): 9 INJECTION, SOLUTION INTRAVENOUS at 12:30

## 2023-12-27 RX ADMIN — Medication 200 MILLIGRAM(S): at 13:12

## 2023-12-27 RX ADMIN — Medication 975 MILLIGRAM(S): at 12:47

## 2023-12-27 NOTE — OB RN TRIAGE NOTE - RESPIRATORY RATE (BREATHS/MIN)
MESSAGE FWD TO DR Roverto Vu TO SIGN AND SEND.     Electronically signed by Dinh Dickens MA on 5/29/2019 at 12:33 PM
18

## 2023-12-27 NOTE — OB RN TRIAGE NOTE - FALL HARM RISK - UNIVERSAL INTERVENTIONS
Bed in lowest position, wheels locked, appropriate side rails in place/Call bell, personal items and telephone in reach/Instruct patient to call for assistance before getting out of bed or chair/Non-slip footwear when patient is out of bed/[place] to call system/Physically safe environment - no spills, clutter or unnecessary equipment/Purposeful Proactive Rounding/Room/bathroom lighting operational, light cord in reach Bed in lowest position, wheels locked, appropriate side rails in place/Call bell, personal items and telephone in reach/Instruct patient to call for assistance before getting out of bed or chair/Non-slip footwear when patient is out of bed/[place] to call system/Physically safe environment - no spills, clutter or unnecessary equipment/Purposeful Proactive Rounding/Room/bathroom lighting operational, light cord in reach

## 2023-12-27 NOTE — OB PROVIDER TRIAGE NOTE - HISTORY OF PRESENT ILLNESS
Pt is a 29yo G1 @ 27w4d here with cough, sore throat, ear pain, nasal congestion x 1 week and belly soreness from coughing x 2 days. Pt seen at Urgent Care and was prescribed Amoxicillin for ear infection for which pt only took it for 4 days. Pt. states Urgent Care performed covid/rvp which was negative. Pt. states the next day it she was seen by her PMD and Covid/RVP were negative. Pt states she took Mucinex for 2 days and noticed relief of nasal congestion but states now she has a dry cough that causes belly soreness. Pt was also prescribed steroids which she didn't take. Pt states she has been febrile for 2 days and took a 375mg Tylenol with minimal relief. Pt also reports dec FM since yesterday which pt attributes to feeling unwell. Pt states when she eats the fetal movements are strong, however, since she hasn't been eating much for the past 2 days, the fetal movements are not as strong. Pt. denies LOF, VB, CTX.  PNC uncomplicated thus far    OBHx: primip  GYNHx: denies ovarian cysts, fibroids, hx of abnormal pap or STDs  PMHx: denies  PSurgHx: denies  Allergies: NKDA  Meds: PNV  Social: No smoking, alcohol, or illicit drug use  Psych: No hx of anxiety or depression

## 2023-12-27 NOTE — OB PROVIDER TRIAGE NOTE - NSHPPHYSICALEXAM_GEN_ALL_CORE
PE:    T(C): 38.1 (12-27-23 @ 11:36), Max: 38.1 (12-27-23 @ 11:23)  HR: 113 (12-27-23 @ 14:25) (98 - 234)  BP: 118/69 (12-27-23 @ 11:36) (118/69 - 118/69)  RR: 18 (12-27-23 @ 11:36) (18 - 18)  SpO2: 97% (12-27-23 @ 14:25) (91% - 100%)    General: NAD, A&Ox3  CV: RRR  Lungs: Clear bilat   Abd: soft, nontender, gravid  VE: deferred  EFM: 135/moderate variablity/+accels/-decels  TOCO: none

## 2023-12-27 NOTE — OB PROVIDER TRIAGE NOTE - NSOBPROVIDERNOTE_OBGYN_ALL_OB_FT
A/P: 29yo G1 @ 37w4d here for triage   - Cough: Robitussin  - Fever: PO Acetaminophen  - CBC, Ua/Uc, Covid/RVP, Strep culture ordered; IV fluids  - will re-assess pending results of labs  - Robitussin sent to pt pharmacy    d/w Dr. Vivian Trevino, PA-C A/P: 27yo G1 @ 37w4d here for triage   - Cough: Robitussin  - Fever: PO Acetaminophen  - CBC, Ua/Uc, Covid/RVP, Strep culture ordered; IV fluids  - will re-assess pending results of labs  - Robitussin sent to pt pharmacy    d/w Dr. Vivian Trevino, PA-C

## 2023-12-28 ENCOUNTER — APPOINTMENT (OUTPATIENT)
Dept: ANTEPARTUM | Facility: CLINIC | Age: 28
End: 2023-12-28

## 2023-12-28 LAB
CULTURE RESULTS: SIGNIFICANT CHANGE UP
CULTURE RESULTS: SIGNIFICANT CHANGE UP
S PYO DNA THROAT QL NAA+PROBE: SIGNIFICANT CHANGE UP
S PYO DNA THROAT QL NAA+PROBE: SIGNIFICANT CHANGE UP
SPECIMEN SOURCE: SIGNIFICANT CHANGE UP
SPECIMEN SOURCE: SIGNIFICANT CHANGE UP

## 2023-12-29 ENCOUNTER — NON-APPOINTMENT (OUTPATIENT)
Age: 28
End: 2023-12-29

## 2023-12-29 DIAGNOSIS — O36.8120 DECREASED FETAL MOVEMENTS, SECOND TRIMESTER, NOT APPLICABLE OR UNSPECIFIED: ICD-10-CM

## 2023-12-29 DIAGNOSIS — R09.81 NASAL CONGESTION: ICD-10-CM

## 2023-12-29 DIAGNOSIS — R05.9 COUGH, UNSPECIFIED: ICD-10-CM

## 2023-12-29 DIAGNOSIS — O26.892 OTHER SPECIFIED PREGNANCY RELATED CONDITIONS, SECOND TRIMESTER: ICD-10-CM

## 2023-12-29 DIAGNOSIS — R07.0 PAIN IN THROAT: ICD-10-CM

## 2023-12-29 DIAGNOSIS — Z3A.27 27 WEEKS GESTATION OF PREGNANCY: ICD-10-CM

## 2023-12-29 DIAGNOSIS — H92.09 OTALGIA, UNSPECIFIED EAR: ICD-10-CM

## 2023-12-29 DIAGNOSIS — R50.9 FEVER, UNSPECIFIED: ICD-10-CM

## 2023-12-29 DIAGNOSIS — O99.891 OTHER SPECIFIED DISEASES AND CONDITIONS COMPLICATING PREGNANCY: ICD-10-CM

## 2024-01-04 ENCOUNTER — ASOB RESULT (OUTPATIENT)
Age: 29
End: 2024-01-04

## 2024-01-04 ENCOUNTER — APPOINTMENT (OUTPATIENT)
Dept: OBGYN | Facility: CLINIC | Age: 29
End: 2024-01-04
Payer: COMMERCIAL

## 2024-01-04 VITALS — DIASTOLIC BLOOD PRESSURE: 78 MMHG | BODY MASS INDEX: 29.54 KG/M2 | SYSTOLIC BLOOD PRESSURE: 132 MMHG | WEIGHT: 183 LBS

## 2024-01-04 DIAGNOSIS — Z3A.28 28 WEEKS GESTATION OF PREGNANCY: ICD-10-CM

## 2024-01-04 DIAGNOSIS — Z23 ENCOUNTER FOR IMMUNIZATION: ICD-10-CM

## 2024-01-04 PROCEDURE — 0502F SUBSEQUENT PRENATAL CARE: CPT

## 2024-01-04 PROCEDURE — 90471 IMMUNIZATION ADMIN: CPT

## 2024-01-04 PROCEDURE — 90715 TDAP VACCINE 7 YRS/> IM: CPT

## 2024-01-04 PROCEDURE — 36415 COLL VENOUS BLD VENIPUNCTURE: CPT

## 2024-01-05 ENCOUNTER — NON-APPOINTMENT (OUTPATIENT)
Age: 29
End: 2024-01-05

## 2024-01-05 LAB
BASOPHILS # BLD AUTO: 0.03 K/UL
BASOPHILS NFR BLD AUTO: 0.3 %
BLD GP AB SCN SERPL QL: NORMAL
EOSINOPHIL # BLD AUTO: 0.04 K/UL
EOSINOPHIL NFR BLD AUTO: 0.4 %
GLUCOSE 1H P 50 G GLC PO SERPL-MCNC: 134 MG/DL
HCT VFR BLD CALC: 35.7 %
HGB BLD-MCNC: 11.5 G/DL
HIV1+2 AB SPEC QL IA.RAPID: NONREACTIVE
IMM GRANULOCYTES NFR BLD AUTO: 0.6 %
LYMPHOCYTES # BLD AUTO: 1.82 K/UL
LYMPHOCYTES NFR BLD AUTO: 19.4 %
MAN DIFF?: NORMAL
MCHC RBC-ENTMCNC: 26.7 PG
MCHC RBC-ENTMCNC: 32.2 GM/DL
MCV RBC AUTO: 83 FL
MONOCYTES # BLD AUTO: 0.46 K/UL
MONOCYTES NFR BLD AUTO: 4.9 %
NEUTROPHILS # BLD AUTO: 6.96 K/UL
NEUTROPHILS NFR BLD AUTO: 74.4 %
PLATELET # BLD AUTO: 277 K/UL
RBC # BLD: 4.3 M/UL
RBC # FLD: 15 %
WBC # FLD AUTO: 9.37 K/UL

## 2024-01-08 ENCOUNTER — APPOINTMENT (OUTPATIENT)
Dept: OBGYN | Facility: CLINIC | Age: 29
End: 2024-01-08

## 2024-01-22 ENCOUNTER — OUTPATIENT (OUTPATIENT)
Dept: OUTPATIENT SERVICES | Facility: HOSPITAL | Age: 29
LOS: 1 days | End: 2024-01-22
Payer: COMMERCIAL

## 2024-01-22 ENCOUNTER — EMERGENCY (EMERGENCY)
Facility: HOSPITAL | Age: 29
LOS: 1 days | Discharge: HOME | End: 2024-01-22
Attending: STUDENT IN AN ORGANIZED HEALTH CARE EDUCATION/TRAINING PROGRAM
Payer: COMMERCIAL

## 2024-01-22 VITALS
SYSTOLIC BLOOD PRESSURE: 129 MMHG | RESPIRATION RATE: 418 BRPM | DIASTOLIC BLOOD PRESSURE: 79 MMHG | OXYGEN SATURATION: 99 % | TEMPERATURE: 98 F | HEART RATE: 108 BPM

## 2024-01-22 VITALS
RESPIRATION RATE: 18 BRPM | SYSTOLIC BLOOD PRESSURE: 133 MMHG | DIASTOLIC BLOOD PRESSURE: 68 MMHG | OXYGEN SATURATION: 99 % | HEART RATE: 98 BPM

## 2024-01-22 VITALS
HEART RATE: 112 BPM | DIASTOLIC BLOOD PRESSURE: 81 MMHG | OXYGEN SATURATION: 98 % | RESPIRATION RATE: 20 BRPM | SYSTOLIC BLOOD PRESSURE: 121 MMHG | HEIGHT: 66 IN | WEIGHT: 182.98 LBS | TEMPERATURE: 99 F

## 2024-01-22 DIAGNOSIS — O26.899 OTHER SPECIFIED PREGNANCY RELATED CONDITIONS, UNSPECIFIED TRIMESTER: ICD-10-CM

## 2024-01-22 LAB — FIBRONECTIN FETAL SPEC QL: NEGATIVE — SIGNIFICANT CHANGE UP

## 2024-01-22 PROCEDURE — 99283 EMERGENCY DEPT VISIT LOW MDM: CPT

## 2024-01-22 PROCEDURE — 93005 ELECTROCARDIOGRAM TRACING: CPT

## 2024-01-22 PROCEDURE — G0463: CPT

## 2024-01-22 PROCEDURE — 99285 EMERGENCY DEPT VISIT HI MDM: CPT

## 2024-01-22 PROCEDURE — 59025 FETAL NON-STRESS TEST: CPT

## 2024-01-22 PROCEDURE — 99213 OFFICE O/P EST LOW 20 MIN: CPT

## 2024-01-22 PROCEDURE — 82731 ASSAY OF FETAL FIBRONECTIN: CPT

## 2024-01-22 RX ORDER — SODIUM CHLORIDE 9 MG/ML
1000 INJECTION, SOLUTION INTRAVENOUS
Refills: 0 | Status: DISCONTINUED | OUTPATIENT
Start: 2024-01-22 | End: 2024-01-22

## 2024-01-22 NOTE — OB PROVIDER TRIAGE NOTE - NSOBPROVIDERNOTE_OBGYN_ALL_OB_FT
Fetal status reassuring. No contractions on monitor. Will send patient back to ED for follow-up on complaints of chest pain. Discussed with Dr. Acuna and Dr. Palacios, pt cleared by obgyn department. Results of FFN pending, early labor unlikely. Counseled pt to see therapist, hydrate, rest, and find ways to reduce stress. Fetal status reassuring. No contractions on monitor. Will send patient back to ED for follow-up on complaints of chest pain. Called ED to let them know to expect pt. Discussed with Dr. Acuna and Dr. Palacios, pt cleared by obgyn department. Results of FFN negative. Counseled pt to see therapist, hydrate, rest, and find ways to reduce stress.

## 2024-01-22 NOTE — ED PROVIDER NOTE - CLINICAL SUMMARY MEDICAL DECISION MAKING FREE TEXT BOX
Patient EKG sinus incomplete right bundle unchanged had same incomplete bundle in 2022 clinically well no signs of DVT low suspicion of PE saturation normal heart rate normal for gestation discussed lab sono imaging patient declined states she does not have any concern for DVT and symptoms resolved states she has a cardiologist she can follow-up with tomorrow patient ANO x 3 clinically stable has capacity discharged after shared decision making return precaution instructed hemodynamically stable on DC

## 2024-01-22 NOTE — OB RN TRIAGE NOTE - MENTAL HEALTH CONDITIONS/SYMPTOMS, PROFILE
Assessment/Plan:    Diagnoses and all orders for this visit:    1. Overweight    2. Mechanical low back pain    3. Elevated cholesterol    Pt declines flu shot today  He declines offer for appt with dietician  He will try for lifestyle changes and 10 lb weight loss. Repeat lipid panel in 6-12 months        [facility] MICHELLE Chris expressed understanding of this plan. An AVS was printed and given to the patient.      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 He has pain at night after work  He has no radiation of pain. No history of bowel or bladder involvement  He does not exercise  He eats ad shlomo, heaviest meals at night  He drinks 2-3 beers on the weekends  His wife cooks for him, he declines referral to dietician      No past medical history on file. Current Outpatient Medications   Medication Sig Dispense Refill    naproxen (NAPROSYN) 500 mg tablet Take 1 Tab by mouth two (2) times daily (with meals). As needed 60 Tab 0    PSEUDOEPH/DM/GUAIFEN/ACETAMIN (THERAFLU MAX-D SEVERE COLD-FLU PO) Take  by mouth. No Known Allergies    Social History     Tobacco Use    Smoking status: Former Smoker     Last attempt to quit: 2020     Years since quittin.8    Smokeless tobacco: Never Used    Tobacco comment: 3 to 4 ciggs per week for 5 years   Substance Use Topics    Alcohol use: Not Currently     Alcohol/week: 10.0 standard drinks     Types: 10 Cans of beer per week     Comment: 10 beers on the weekends/ stopped     Drug use: Not Currently     Types: Marijuana     Comment: as teenager       No family history on file.     Physical Exam:     Visit Vitals  /72 (BP 1 Location: Left arm, BP Patient Position: Sitting)   Pulse 96 Temp 97 °F (36.1 °C)   Ht 5' 4.57\" (1.64 m)   Wt 206 lb 3.2 oz (93.5 kg)   BMI 34.78 kg/m²       A&Ox3  WDWN NAD  Respirations normal and non labored  No back pain currently  Cor RRR s1s2  Lungs CTA servando  MSK- pain is reproduced with forward bend at waist  No acute pain on palpation  Gait intact  No foot drop  Neuro intact  abd- overweight, no masses none

## 2024-01-22 NOTE — ED PROVIDER NOTE - PATIENT PORTAL LINK FT
You can access the FollowMyHealth Patient Portal offered by [facility] by registering at the following website: [email or web address]. By joining [employer]’s FollowMyHealth portal, you will also be able to view your health information using other applications (apps) compatible with our system.

## 2024-01-22 NOTE — OB PROVIDER TRIAGE NOTE - HISTORY OF PRESENT ILLNESS
27yo  at 31.2 weeks presents with complaints of abdominal pain characterized as "spasms" and lower back pain. Pt was brought up from ED where she was also assessed for chest pain- EKG done but needs to be read. Pt reported her chest pain had gone away while we were speaking. Pt reports she has been very stressed lately and "crying a lot" and just wanted to make sure baby was okay. Pt reported abdominal pain started last night and has not gone away, but characterizes it as intermittent.  29yo  at 31.2 weeks presents with complaints of abdominal pain characterized as "spasms" and lower back pain. Pt was brought up from ED where she was also assessed for chest pain- EKG done but needs to be read. Pt reported her chest pain had gone away while we were speaking. Reports chest pain worsens when she thinks about things she is stressed out about and lessens when she feels more relaxed. Pt reports she has been very stressed lately and "crying a lot" and just wanted to make sure baby was okay. Pt reported lower abdominal pain started last night and has not gone away, but characterizes it as intermittent. Pt endorses fetal movement, denies vaginal bleeding and leakage of fluid.

## 2024-01-22 NOTE — OB PROVIDER TRIAGE NOTE - NS_PELVICEXAM_OBGYN_ALL_OB
HPI:   Macy Castro is a 32year old female who presents for a complete physical exam.    Work: [employer]  Social: Lives with her 3 cats, she is  from her .   Diet: eats home cooked meals   Exercise: Sedentary  GYN history:   LM Diabetes Maternal Uncle    • Glaucoma Neg       Social History:   Social History    Tobacco Use      Smoking status: Never Smoker      Smokeless tobacco: Never Used    Vaping Use      Vaping Use: Never used    Alcohol use:  Yes      Alcohol/week: 0.0 ferny Yes

## 2024-01-22 NOTE — ED PROVIDER NOTE - IV ALTEPLASE ADMIN OUTSIDE HIDDEN
show
transfer training/neuromuscular re-education/energy conservation techniques/ROM/ADL retraining/balance training/bed mobility training/motor coordination training/IADL retraining/joint mobilization/parent/caregiver training...

## 2024-01-22 NOTE — ED PROVIDER NOTE - OBJECTIVE STATEMENT
Patient presenting with chest pain abdominal pain 31 weeks pregnant was cleared by OB states that she was told that her abdominal pain was Deepak Poe was told to come back to the ED concern for PE patient states that she currently has no chest pain no hemoptysis no leg swelling that is new no calf pain no shortness of breath states that the obese said that her EKG was abnormal EKG with patient shows incomplete right bundle's patient states she always had that finding on her EKG Patient presenting with chest pain abdominal pain 31 weeks pregnant was cleared by OB states that she was told that her abdominal pain was Deepak Poe was told to come back to the ED concern for PE patient states that she currently has no chest pain no hemoptysis no leg swelling that is new no calf pain no shortness of breath states that the obese said that her EKG was abnormal EKG with patient shows incomplete right bundle's patient states she always had that finding on her EKG. Patient endorses that she had similar chest pain in the past when she feels anxious currently resolved

## 2024-01-22 NOTE — ED ADULT TRIAGE NOTE - CHIEF COMPLAINT QUOTE
PT REPORTS 31 WEEKS PREGNANT. C/O CHEST PAIN AND LOWER ABDOMINAL PAIN AROUND 3 PM. WAS CLEARED BY OBGYN. PT DENIES PAIN , VAG BLEED OR DISCHARGE AT PRESENT

## 2024-01-22 NOTE — OB RN TRIAGE NOTE - NS_FETALMOVEMENT_OBGYN_ALL_OB
Present, unchanged I personally evaluated the patient. I reviewed the Resident’s or Physician Assistant’s note (as assigned above), and agree with the findings and plan except as documented in my note.

## 2024-01-22 NOTE — ED ADULT NURSE NOTE - EXTENSIONS OF SELF_ADULT
May 29, 2018        Pinky Li  [address]  [facility] [address]         Dear Ms. Sanzsherronmaliha,    [facility] wants to provide its patients with quality medical care. According to our records, you are due for a follow up appointment. Please call my office and schedule an appointment at your earliest convenience.      Sincerely,    Matt Godinez MD  The Department of Endocrinology  [address] [place] [id number] [phone]  
None

## 2024-01-24 DIAGNOSIS — M54.50 LOW BACK PAIN, UNSPECIFIED: ICD-10-CM

## 2024-01-24 DIAGNOSIS — O26.893 OTHER SPECIFIED PREGNANCY RELATED CONDITIONS, THIRD TRIMESTER: ICD-10-CM

## 2024-01-24 DIAGNOSIS — O99.891 OTHER SPECIFIED DISEASES AND CONDITIONS COMPLICATING PREGNANCY: ICD-10-CM

## 2024-01-24 DIAGNOSIS — R07.9 CHEST PAIN, UNSPECIFIED: ICD-10-CM

## 2024-01-24 DIAGNOSIS — Z3A.31 31 WEEKS GESTATION OF PREGNANCY: ICD-10-CM

## 2024-01-24 DIAGNOSIS — R10.30 LOWER ABDOMINAL PAIN, UNSPECIFIED: ICD-10-CM

## 2024-01-25 ENCOUNTER — APPOINTMENT (OUTPATIENT)
Dept: OBGYN | Facility: CLINIC | Age: 29
End: 2024-01-25
Payer: COMMERCIAL

## 2024-01-25 ENCOUNTER — ASOB RESULT (OUTPATIENT)
Age: 29
End: 2024-01-25

## 2024-01-25 VITALS
BODY MASS INDEX: 29.25 KG/M2 | SYSTOLIC BLOOD PRESSURE: 115 MMHG | WEIGHT: 182 LBS | HEIGHT: 66 IN | DIASTOLIC BLOOD PRESSURE: 74 MMHG

## 2024-01-25 DIAGNOSIS — Z34.93 ENCOUNTER FOR SUPERVISION OF NORMAL PREGNANCY, UNSPECIFIED, THIRD TRIMESTER: ICD-10-CM

## 2024-01-25 PROCEDURE — 0502F SUBSEQUENT PRENATAL CARE: CPT

## 2024-01-25 PROCEDURE — 76819 FETAL BIOPHYS PROFIL W/O NST: CPT | Mod: 59

## 2024-01-25 PROCEDURE — 76816 OB US FOLLOW-UP PER FETUS: CPT

## 2024-02-01 ENCOUNTER — APPOINTMENT (OUTPATIENT)
Dept: OBGYN | Facility: CLINIC | Age: 29
End: 2024-02-01
Payer: COMMERCIAL

## 2024-02-01 VITALS
HEIGHT: 66 IN | BODY MASS INDEX: 29.57 KG/M2 | WEIGHT: 184 LBS | SYSTOLIC BLOOD PRESSURE: 113 MMHG | DIASTOLIC BLOOD PRESSURE: 76 MMHG

## 2024-02-01 DIAGNOSIS — Z3A.32 32 WEEKS GESTATION OF PREGNANCY: ICD-10-CM

## 2024-02-01 PROCEDURE — 0502F SUBSEQUENT PRENATAL CARE: CPT

## 2024-02-15 ENCOUNTER — NON-APPOINTMENT (OUTPATIENT)
Age: 29
End: 2024-02-15

## 2024-02-15 ENCOUNTER — APPOINTMENT (OUTPATIENT)
Dept: OBGYN | Facility: CLINIC | Age: 29
End: 2024-02-15
Payer: COMMERCIAL

## 2024-02-15 VITALS — WEIGHT: 188 LBS | BODY MASS INDEX: 30.34 KG/M2 | DIASTOLIC BLOOD PRESSURE: 76 MMHG | SYSTOLIC BLOOD PRESSURE: 115 MMHG

## 2024-02-15 DIAGNOSIS — Z3A.34 34 WEEKS GESTATION OF PREGNANCY: ICD-10-CM

## 2024-02-15 PROCEDURE — 0502F SUBSEQUENT PRENATAL CARE: CPT

## 2024-02-22 ENCOUNTER — APPOINTMENT (OUTPATIENT)
Dept: OBGYN | Facility: CLINIC | Age: 29
End: 2024-02-22
Payer: COMMERCIAL

## 2024-02-22 ENCOUNTER — ASOB RESULT (OUTPATIENT)
Age: 29
End: 2024-02-22

## 2024-02-22 VITALS
DIASTOLIC BLOOD PRESSURE: 76 MMHG | WEIGHT: 186 LBS | SYSTOLIC BLOOD PRESSURE: 112 MMHG | HEIGHT: 66 IN | BODY MASS INDEX: 29.89 KG/M2

## 2024-02-22 DIAGNOSIS — Z3A.35 35 WEEKS GESTATION OF PREGNANCY: ICD-10-CM

## 2024-02-22 PROCEDURE — 0502F SUBSEQUENT PRENATAL CARE: CPT

## 2024-02-22 PROCEDURE — 76816 OB US FOLLOW-UP PER FETUS: CPT

## 2024-02-22 PROCEDURE — 76819 FETAL BIOPHYS PROFIL W/O NST: CPT | Mod: 59

## 2024-02-25 ENCOUNTER — NON-APPOINTMENT (OUTPATIENT)
Age: 29
End: 2024-02-25

## 2024-02-25 LAB
GP B STREP DNA SPEC QL NAA+PROBE: DETECTED
SOURCE GBS: NORMAL

## 2024-02-26 ENCOUNTER — NON-APPOINTMENT (OUTPATIENT)
Age: 29
End: 2024-02-26

## 2024-02-27 ENCOUNTER — NON-APPOINTMENT (OUTPATIENT)
Age: 29
End: 2024-02-27

## 2024-02-27 ENCOUNTER — APPOINTMENT (OUTPATIENT)
Dept: OBGYN | Facility: CLINIC | Age: 29
End: 2024-02-27
Payer: COMMERCIAL

## 2024-02-27 ENCOUNTER — ASOB RESULT (OUTPATIENT)
Age: 29
End: 2024-02-27

## 2024-02-27 VITALS — WEIGHT: 188 LBS | BODY MASS INDEX: 30.34 KG/M2 | DIASTOLIC BLOOD PRESSURE: 85 MMHG | SYSTOLIC BLOOD PRESSURE: 126 MMHG

## 2024-02-27 DIAGNOSIS — Z3A.36 36 WEEKS GESTATION OF PREGNANCY: ICD-10-CM

## 2024-02-27 PROCEDURE — 76819 FETAL BIOPHYS PROFIL W/O NST: CPT

## 2024-02-27 PROCEDURE — 0502F SUBSEQUENT PRENATAL CARE: CPT

## 2024-02-29 PROBLEM — Z3A.36 36 WEEKS GESTATION OF PREGNANCY: Status: ACTIVE | Noted: 2024-02-27

## 2024-03-05 ENCOUNTER — APPOINTMENT (OUTPATIENT)
Dept: OBGYN | Facility: CLINIC | Age: 29
End: 2024-03-05
Payer: COMMERCIAL

## 2024-03-05 ENCOUNTER — ASOB RESULT (OUTPATIENT)
Age: 29
End: 2024-03-05

## 2024-03-05 VITALS — SYSTOLIC BLOOD PRESSURE: 125 MMHG | BODY MASS INDEX: 30.51 KG/M2 | WEIGHT: 189 LBS | DIASTOLIC BLOOD PRESSURE: 82 MMHG

## 2024-03-05 DIAGNOSIS — Z3A.37 37 WEEKS GESTATION OF PREGNANCY: ICD-10-CM

## 2024-03-05 PROCEDURE — 76816 OB US FOLLOW-UP PER FETUS: CPT

## 2024-03-05 PROCEDURE — 0502F SUBSEQUENT PRENATAL CARE: CPT

## 2024-03-11 ENCOUNTER — NON-APPOINTMENT (OUTPATIENT)
Age: 29
End: 2024-03-11

## 2024-03-14 ENCOUNTER — ASOB RESULT (OUTPATIENT)
Age: 29
End: 2024-03-14

## 2024-03-14 ENCOUNTER — APPOINTMENT (OUTPATIENT)
Dept: OBGYN | Facility: CLINIC | Age: 29
End: 2024-03-14
Payer: COMMERCIAL

## 2024-03-14 ENCOUNTER — TRANSCRIPTION ENCOUNTER (OUTPATIENT)
Age: 29
End: 2024-03-14

## 2024-03-14 ENCOUNTER — INPATIENT (INPATIENT)
Facility: HOSPITAL | Age: 29
LOS: 3 days | Discharge: HOME | End: 2024-03-18
Attending: SPECIALIST | Admitting: SPECIALIST
Payer: COMMERCIAL

## 2024-03-14 VITALS
SYSTOLIC BLOOD PRESSURE: 127 MMHG | DIASTOLIC BLOOD PRESSURE: 76 MMHG | BODY MASS INDEX: 30.86 KG/M2 | WEIGHT: 192 LBS | HEIGHT: 66 IN

## 2024-03-14 VITALS — OXYGEN SATURATION: 81 % | HEART RATE: 72 BPM

## 2024-03-14 DIAGNOSIS — Z34.80 ENCOUNTER FOR SUPERVISION OF OTHER NORMAL PREGNANCY, UNSPECIFIED TRIMESTER: ICD-10-CM

## 2024-03-14 DIAGNOSIS — Z3A.38 38 WEEKS GESTATION OF PREGNANCY: ICD-10-CM

## 2024-03-14 DIAGNOSIS — O36.5990 MATERNAL CARE FOR OTHER KNOWN OR SUSPECTED POOR FETAL GROWTH, UNSPECIFIED TRIMESTER, NOT APPLICABLE OR UNSPECIFIED: ICD-10-CM

## 2024-03-14 DIAGNOSIS — O26.899 OTHER SPECIFIED PREGNANCY RELATED CONDITIONS, UNSPECIFIED TRIMESTER: ICD-10-CM

## 2024-03-14 LAB
BASOPHILS # BLD AUTO: 0.05 K/UL — SIGNIFICANT CHANGE UP (ref 0–0.2)
BASOPHILS NFR BLD AUTO: 0.4 % — SIGNIFICANT CHANGE UP (ref 0–2)
BLD GP AB SCN SERPL QL: NEGATIVE — SIGNIFICANT CHANGE UP
EOSINOPHIL # BLD AUTO: 0.07 K/UL — SIGNIFICANT CHANGE UP (ref 0–0.5)
EOSINOPHIL NFR BLD AUTO: 0.6 % — SIGNIFICANT CHANGE UP (ref 0–6)
HCT VFR BLD CALC: 36.6 % — SIGNIFICANT CHANGE UP (ref 34.5–45)
HGB BLD-MCNC: 12.4 G/DL — SIGNIFICANT CHANGE UP (ref 11.5–15.5)
IMM GRANULOCYTES NFR BLD AUTO: 0.5 % — SIGNIFICANT CHANGE UP (ref 0–0.9)
LYMPHOCYTES # BLD AUTO: 16.9 % — SIGNIFICANT CHANGE UP (ref 13–44)
LYMPHOCYTES # BLD AUTO: 2.01 K/UL — SIGNIFICANT CHANGE UP (ref 1–3.3)
MCHC RBC-ENTMCNC: 26.4 PG — LOW (ref 27–34)
MCHC RBC-ENTMCNC: 33.9 GM/DL — SIGNIFICANT CHANGE UP (ref 32–36)
MCV RBC AUTO: 77.9 FL — LOW (ref 80–100)
MONOCYTES # BLD AUTO: 0.75 K/UL — SIGNIFICANT CHANGE UP (ref 0–0.9)
MONOCYTES NFR BLD AUTO: 6.3 % — SIGNIFICANT CHANGE UP (ref 2–14)
NEUTROPHILS # BLD AUTO: 8.93 K/UL — HIGH (ref 1.8–7.4)
NEUTROPHILS NFR BLD AUTO: 75.3 % — SIGNIFICANT CHANGE UP (ref 43–77)
NRBC # BLD: 0 /100 WBCS — SIGNIFICANT CHANGE UP (ref 0–0)
PLATELET # BLD AUTO: 275 K/UL — SIGNIFICANT CHANGE UP (ref 150–400)
RBC # BLD: 4.7 M/UL — SIGNIFICANT CHANGE UP (ref 3.8–5.2)
RBC # FLD: 14.6 % — HIGH (ref 10.3–14.5)
RH IG SCN BLD-IMP: POSITIVE — SIGNIFICANT CHANGE UP
WBC # BLD: 11.87 K/UL — HIGH (ref 3.8–10.5)
WBC # FLD AUTO: 11.87 K/UL — HIGH (ref 3.8–10.5)

## 2024-03-14 PROCEDURE — 59025 FETAL NON-STRESS TEST: CPT | Mod: 59

## 2024-03-14 PROCEDURE — 0502F SUBSEQUENT PRENATAL CARE: CPT

## 2024-03-14 RX ORDER — CHLORHEXIDINE GLUCONATE 213 G/1000ML
1 SOLUTION TOPICAL DAILY
Refills: 0 | Status: DISCONTINUED | OUTPATIENT
Start: 2024-03-14 | End: 2024-03-16

## 2024-03-14 RX ORDER — CITRIC ACID/SODIUM CITRATE 300-500 MG
15 SOLUTION, ORAL ORAL EVERY 6 HOURS
Refills: 0 | Status: DISCONTINUED | OUTPATIENT
Start: 2024-03-14 | End: 2024-03-16

## 2024-03-14 RX ORDER — SODIUM CHLORIDE 9 MG/ML
1000 INJECTION, SOLUTION INTRAVENOUS ONCE
Refills: 0 | Status: DISCONTINUED | OUTPATIENT
Start: 2024-03-14 | End: 2024-03-18

## 2024-03-14 RX ORDER — OXYTOCIN 10 UNIT/ML
333.33 VIAL (ML) INJECTION
Qty: 20 | Refills: 0 | Status: DISCONTINUED | OUTPATIENT
Start: 2024-03-14 | End: 2024-03-18

## 2024-03-14 RX ORDER — AMPICILLIN TRIHYDRATE 250 MG
2 CAPSULE ORAL ONCE
Refills: 0 | Status: COMPLETED | OUTPATIENT
Start: 2024-03-14 | End: 2024-03-14

## 2024-03-14 RX ORDER — SODIUM CHLORIDE 9 MG/ML
1000 INJECTION, SOLUTION INTRAVENOUS
Refills: 0 | Status: DISCONTINUED | OUTPATIENT
Start: 2024-03-14 | End: 2024-03-16

## 2024-03-14 RX ORDER — AMPICILLIN TRIHYDRATE 250 MG
1 CAPSULE ORAL EVERY 4 HOURS
Refills: 0 | Status: DISCONTINUED | OUTPATIENT
Start: 2024-03-14 | End: 2024-03-15

## 2024-03-14 RX ADMIN — SODIUM CHLORIDE 125 MILLILITER(S): 9 INJECTION, SOLUTION INTRAVENOUS at 18:58

## 2024-03-14 RX ADMIN — Medication 108 GRAM(S): at 23:20

## 2024-03-14 RX ADMIN — Medication 200 GRAM(S): at 18:58

## 2024-03-14 NOTE — OB PROVIDER H&P - HISTORY OF PRESENT ILLNESS
OB PA Admission H&P    28yoF  @38.5 weeks gestation (DEMETRI 3/23/24) presents for IOL 2/2 IUGR (EFW 15%, AC 3%, nml dopplers). Endorses +FM. Denies -LOF. -CTXs. -VB. PNC otherwise has been uncomplicated, GBS positive. Pt denies any other concerns.    – PNC: GBS positive. EFW 2705g (5#15 sono 3/11)  – OBHx: primigravida   – GynHx: denies h/o fibroids, ovarian cysts, abnl pap smears, STDs  – PMH: denies h/o HTN, DM, asthma, thyroid disorders, bleeding disorders, h/o blood transfusions   – PSH: denies  – Psych: denies anxiety/depression   – Social: denies alcohol/tobacco/drug use in pregnancy   – Meds: PNV   – Allergies: NKDA  – Will accept blood transfusions: Yes    Vital Signs Last 24 Hrs  HR: 108 (14 Mar 2024 19:01) (72 - 111)  BP: 135/84 (14 Mar 2024 18:14) (135/84 - 135/84)  SpO2: 98% (14 Mar 2024 19:01) (81% - 98%)    Gen: NAD  CV: RRR  Lungs: CTA b/l   Abd: gravid, non-tender  Ext: BLE non-edematous, no calf tenderness b/l     – VE: 1/0/-3 examined in office today  – FHT: baseline 150, mod variability, +accels, -decels  – [place]: irritability   – EFW: 2705g   – Sono: vertex

## 2024-03-14 NOTE — OB RN PATIENT PROFILE - INTERNATIONAL TRAVEL
58yo F presents w/ parastomal hernia  Per CT, the neck of hernia is large and reduction attempted, but bowel herniates easily. no obstruction    Plan:  - patient can be safely discharged from ED - please have patient follow up with Dr. Carroll in 2 weeks to discuss regarding ostomy reversal  - please discharge patient with stool softener including miralax, dulcolax and colace  - patient is receiving wound care at the rehab. Continue wound care    Plan discussed with CRS team and attending, Dr. Carroll No

## 2024-03-14 NOTE — PRE-ANESTHESIA EVALUATION ADULT - NSANTHOSAYNRD_GEN_A_CORE
No. ISAÍAS screening performed.  STOP BANG Legend: 0-2 = LOW Risk; 3-4 = INTERMEDIATE Risk; 5-8 = HIGH Risk
No. ISAÍAS screening performed.  STOP BANG Legend: 0-2 = LOW Risk; 3-4 = INTERMEDIATE Risk; 5-8 = HIGH Risk

## 2024-03-14 NOTE — OB PROVIDER H&P - ALERT: PERTINENT HISTORY
used
1st Trimester Sonogram/20 Week Level II Sonogram/BioPhysical Profile(s)/Fetal Non-Stress Test (NST)

## 2024-03-14 NOTE — OB PROVIDER H&P - ASSESSMENT
A/P: 28yoF  @38.5 weeks gestation (DEMETRI 3/23/24) admitted for IOL 2/2 IUGR (AC 3%, nml dopplers). GBS positive.   - Admit to L&D  - Routine Labs. IVF   - EFM/[place]: continuous monitoring   - IOL with Buccal Cytotec & Cervical balloon when able   - Ampicillin for GBS ppx  - Anesthesia consult -> epidural prn   - D/w Dr. Mona Lockhart, PA-C

## 2024-03-15 LAB — T PALLIDUM AB TITR SER: NEGATIVE — SIGNIFICANT CHANGE UP

## 2024-03-15 PROCEDURE — 88307 TISSUE EXAM BY PATHOLOGIST: CPT | Mod: 26

## 2024-03-15 PROCEDURE — 59515 CESAREAN DELIVERY: CPT

## 2024-03-15 RX ORDER — TETANUS TOXOID, REDUCED DIPHTHERIA TOXOID AND ACELLULAR PERTUSSIS VACCINE, ADSORBED 5; 2.5; 8; 8; 2.5 [IU]/.5ML; [IU]/.5ML; UG/.5ML; UG/.5ML; UG/.5ML
0.5 SUSPENSION INTRAMUSCULAR ONCE
Refills: 0 | Status: DISCONTINUED | OUTPATIENT
Start: 2024-03-16 | End: 2024-03-18

## 2024-03-15 RX ORDER — MAGNESIUM HYDROXIDE 400 MG/1
30 TABLET, CHEWABLE ORAL
Refills: 0 | Status: DISCONTINUED | OUTPATIENT
Start: 2024-03-16 | End: 2024-03-18

## 2024-03-15 RX ORDER — PIPERACILLIN AND TAZOBACTAM 4; .5 G/20ML; G/20ML
4.5 INJECTION, POWDER, LYOPHILIZED, FOR SOLUTION INTRAVENOUS ONCE
Refills: 0 | Status: COMPLETED | OUTPATIENT
Start: 2024-03-16 | End: 2024-03-16

## 2024-03-15 RX ORDER — OXYTOCIN 10 UNIT/ML
4 VIAL (ML) INJECTION
Qty: 30 | Refills: 0 | Status: DISCONTINUED | OUTPATIENT
Start: 2024-03-15 | End: 2024-03-18

## 2024-03-15 RX ORDER — IBUPROFEN 200 MG
600 TABLET ORAL EVERY 6 HOURS
Refills: 0 | Status: COMPLETED | OUTPATIENT
Start: 2024-03-16 | End: 2025-02-12

## 2024-03-15 RX ORDER — ONDANSETRON 8 MG/1
4 TABLET, FILM COATED ORAL ONCE
Refills: 0 | Status: COMPLETED | OUTPATIENT
Start: 2024-03-15 | End: 2024-03-15

## 2024-03-15 RX ORDER — OXYCODONE HYDROCHLORIDE 5 MG/1
5 TABLET ORAL
Refills: 0 | Status: COMPLETED | OUTPATIENT
Start: 2024-03-16 | End: 2024-03-23

## 2024-03-15 RX ORDER — LANOLIN
1 OINTMENT (GRAM) TOPICAL EVERY 6 HOURS
Refills: 0 | Status: DISCONTINUED | OUTPATIENT
Start: 2024-03-16 | End: 2024-03-18

## 2024-03-15 RX ORDER — SODIUM CHLORIDE 9 MG/ML
500 INJECTION, SOLUTION INTRAVENOUS ONCE
Refills: 0 | Status: DISCONTINUED | OUTPATIENT
Start: 2024-03-15 | End: 2024-03-18

## 2024-03-15 RX ORDER — INFLUENZA VIRUS VACCINE 15; 15; 15; 15 UG/.5ML; UG/.5ML; UG/.5ML; UG/.5ML
0.5 SUSPENSION INTRAMUSCULAR ONCE
Refills: 0 | Status: DISCONTINUED | OUTPATIENT
Start: 2024-03-15 | End: 2024-03-18

## 2024-03-15 RX ORDER — OXYTOCIN 10 UNIT/ML
333.33 VIAL (ML) INJECTION
Qty: 20 | Refills: 0 | Status: DISCONTINUED | OUTPATIENT
Start: 2024-03-16 | End: 2024-03-18

## 2024-03-15 RX ORDER — SODIUM CHLORIDE 9 MG/ML
1000 INJECTION, SOLUTION INTRAVENOUS
Refills: 0 | Status: DISCONTINUED | OUTPATIENT
Start: 2024-03-16 | End: 2024-03-18

## 2024-03-15 RX ORDER — FAMOTIDINE 10 MG/ML
20 INJECTION INTRAVENOUS ONCE
Refills: 0 | Status: COMPLETED | OUTPATIENT
Start: 2024-03-15 | End: 2024-03-15

## 2024-03-15 RX ORDER — OXYCODONE HYDROCHLORIDE 5 MG/1
5 TABLET ORAL ONCE
Refills: 0 | Status: DISCONTINUED | OUTPATIENT
Start: 2024-03-16 | End: 2024-03-18

## 2024-03-15 RX ORDER — SIMETHICONE 80 MG/1
80 TABLET, CHEWABLE ORAL EVERY 4 HOURS
Refills: 0 | Status: DISCONTINUED | OUTPATIENT
Start: 2024-03-16 | End: 2024-03-18

## 2024-03-15 RX ORDER — DIPHENHYDRAMINE HCL 50 MG
25 CAPSULE ORAL ONCE
Refills: 0 | Status: COMPLETED | OUTPATIENT
Start: 2024-03-15 | End: 2024-03-15

## 2024-03-15 RX ORDER — ACETAMINOPHEN 500 MG
1000 TABLET ORAL ONCE
Refills: 0 | Status: COMPLETED | OUTPATIENT
Start: 2024-03-15 | End: 2024-03-15

## 2024-03-15 RX ORDER — ACETAMINOPHEN 500 MG
975 TABLET ORAL
Refills: 0 | Status: DISCONTINUED | OUTPATIENT
Start: 2024-03-16 | End: 2024-03-18

## 2024-03-15 RX ORDER — PIPERACILLIN AND TAZOBACTAM 4; .5 G/20ML; G/20ML
4.5 INJECTION, POWDER, LYOPHILIZED, FOR SOLUTION INTRAVENOUS EVERY 8 HOURS
Refills: 0 | Status: COMPLETED | OUTPATIENT
Start: 2024-03-16 | End: 2024-03-17

## 2024-03-15 RX ORDER — PIPERACILLIN AND TAZOBACTAM 4; .5 G/20ML; G/20ML
4.5 INJECTION, POWDER, LYOPHILIZED, FOR SOLUTION INTRAVENOUS ONCE
Refills: 0 | Status: COMPLETED | OUTPATIENT
Start: 2024-03-15 | End: 2024-03-15

## 2024-03-15 RX ORDER — DIPHENHYDRAMINE HCL 50 MG
25 CAPSULE ORAL EVERY 6 HOURS
Refills: 0 | Status: DISCONTINUED | OUTPATIENT
Start: 2024-03-16 | End: 2024-03-18

## 2024-03-15 RX ADMIN — Medication 4 MILLIUNIT(S)/MIN: at 09:12

## 2024-03-15 RX ADMIN — Medication 108 GRAM(S): at 15:26

## 2024-03-15 RX ADMIN — Medication 108 GRAM(S): at 07:23

## 2024-03-15 RX ADMIN — FAMOTIDINE 20 MILLIGRAM(S): 10 INJECTION INTRAVENOUS at 10:26

## 2024-03-15 RX ADMIN — Medication 108 GRAM(S): at 03:16

## 2024-03-15 RX ADMIN — Medication 108 GRAM(S): at 11:17

## 2024-03-15 RX ADMIN — Medication 400 MILLIGRAM(S): at 22:57

## 2024-03-15 RX ADMIN — ONDANSETRON 4 MILLIGRAM(S): 8 TABLET, FILM COATED ORAL at 11:13

## 2024-03-15 RX ADMIN — Medication 25 MILLIGRAM(S): at 16:13

## 2024-03-15 NOTE — OB RN INTRAOPERATIVE NOTE - NSSELHIDDEN_OBGYN_ALL_OB_FT
[NS_DeliveryAttending1_OBGYN_ALL_OB_FT:[id number]],[NS_DeliveryAssist1_OBGYN_ALL_OB_FT:[id number]],[NS_DeliveryAssist2_OBGYN_ALL_OB_FT:[id number]]

## 2024-03-15 NOTE — OB RN INTRAOPERATIVE NOTE - NS_POSTSURGSKIN_OBGYN_ALL_OB
Detail Level: Detailed Wound Evaluated By: Brent Banuelos M.D.\\n\\nWound care instructions were reviewed in detail. Additional Comments: Reassured the patient everything is going well. Other

## 2024-03-15 NOTE — OB PROVIDER DELIVERY SUMMARY - NSPROVIDERDELIVERYNOTE_OBGYN_ALL_OB_FT
primary LTCS, uncomplicated  viable female infant, vertex, OP, asynclitic presentation, weight 5#9 (2515g), Apgars 9/9, cord gasses sent  Grossly normal fallopian tubes, uterus, and ovaries    EBL: 609  IVF: 1500  UOP: 300    Dictation: Edelmira Fall, PGY-2 primary LTCS, uncomplicated  viable female infant, vertex, OP, asynclitic presentation, weight 5#9 (2515g), Apgars 9/9, cord gasses sent  Grossly normal fallopian tubes, uterus, and ovaries    EBL: 609  IVF: 1500  UOP: 300    Dictation: #[id number]    Galindo Fall, PGY-2 primary LTCS, uncomplicated  viable female infant, vertex, OP, asynclitic presentation, weight 5#9 (2515g), Apgars 9/9, cord gases sent  Grossly normal fallopian tubes, uterus, and ovaries    EBL: 609  IVF: 1500  UOP: 300    Dictation: #[id number]    Galindo Fall, PGY-2

## 2024-03-15 NOTE — OB RN DELIVERY SUMMARY - NS_SEROLOGYDONE_OBGYN_ALL_OB
He should never stop his plavix      There is a high risk of stent thrombosis which is associated with a very high mortality      Thanks      ZN  
----- Message from Adrianna Caceres MA sent at 11/21/2019  4:17 PM CST -----  Contact: [phone]/self    ,       Patient stated he was suppose to have EGD at [facility] By Dr Pool.    He wanted you to know that he stopped his Plavix 11-16-19 , then [facility]   didn't do the EGD due to  Because patient  had stopped his plavix.    So patient stated he restarted  plavix 11-20 19  On his own.  Patient stated  did he do the right thing???    Please Advise--  Renetta       ----- Message -----  From: Elaina Calvillo  Sent: 11/21/2019  10:35 AM CST  To: Latha GUZMAN Staff    Patient requesting to speak with you regarding medication and an upcoming procedure. Please advise.      
Yes

## 2024-03-15 NOTE — OB PROVIDER LABOR PROGRESS NOTE - NS_OBIHIFHRDETAILS_OBGYN_ALL_OB_FT
135/moderate variability/+accels/-decels
Baseline: 130 bpm, moderate variability,  + accels, - decels
150, moderate variability, +accels, -decels
150, moderate variability, +accels, +intermittent late and variable decels
165 bpm/mod [place]/+accels/recurrent variables. improved with position

## 2024-03-15 NOTE — OB RN DELIVERY SUMMARY - NSSELHIDDEN_OBGYN_ALL_OB_FT
[NS_DeliveryAttending1_OBGYN_ALL_OB_FT:[id number]],[NS_DeliveryAssist1_OBGYN_ALL_OB_FT:[id number]],[NS_DeliveryAssist2_OBGYN_ALL_OB_FT:[id number]]

## 2024-03-15 NOTE — OB PROVIDER LABOR PROGRESS NOTE - ASSESSMENT
Pt is a 27yo  admitted for IOL for IUGR now with cervical balloon in place.    - Continue cont EFM, toco, IVF  - Continue IOL with buccal cytotec and cervical balloon    Rena Muhammad MD PGY1
28 year old  at 38.6 weeks IUGR IOL s/p BC/CB w/ AROM@ 1230p on Pitocin with IUPC placed for uterine contraction intensity    -Continue current management  -Benadryl for cervical swelling  -Amnioinfusion as needed    d/w MD Ashly Samaniego FNP-bC
28 year old  at 38.6 weeks IUGR IOL s/p CB/BC currently on pitocin, now AROM clear fluid; cat 1 tracing    -Continue current management    d/w MD Ashly Samaniego FNP-BC
cat 2 tracing, improved with position  Re-examined pt 4/60/-3, cervix still posterior  discussed with pt extensively re: no cervical change and cat 2 tracing. Although the category 2 FHT has improved with positional changes. I expressed my concern for no cervical change despite adequate contractions and intermittent cat 2 tracing. R/B/A of continuing the induction or proceeding with c/s were discussed. Pt elects to proceed with PLTCS  Pt currently afebrile but discussed with pt that i will watch her closely for s/s of IAI and treat accordingly.  Anesthesia, nursing, OB teams informed    JAYNA Limon MD
28yoF  @38.5 weeks gestation (DEMETRI 3/23/24) admitted for IOL 2/2 IUGR (AC 3%, nml dopplers). GBS positive.    -Cervical balloon in place  -GBS positive: Ampicillin  -Continue buccal cytotec  -Pain control: epidural  -Continue to monitor EFM/toco    Flora Mcintosh PA-C
28yoF  @38.5 weeks gestation (DEMETRI 3/23/24) admitted for IOL 2/2 IUGR (AC 3%, nml dopplers). GBS positive.     -Cervical balloon placement discussed with pt, pt was uncomfortable during cervical exam and desires an epidural before cervical balloon  -Pain control: anasthesia consult for an epidural  -GBS positive: Ampicillin  -Continue buccal cytotec    Flora Mcintosh PA-C

## 2024-03-15 NOTE — OB RN DELIVERY SUMMARY - NS_SEPSISRSKCALC_OBGYN_ALL_OB_FT
EOS calculated successfully. EOS Risk Factor: 0.5/1000 live births ([facility] national incidence); GA=38w6d; Temp=99.14; ROM=7.667; GBS='Positive'; Antibiotics='GBS specific antibiotics > 2 hrs prior to birth'

## 2024-03-15 NOTE — OB PROVIDER DELIVERY SUMMARY - NSSELHIDDEN_OBGYN_ALL_OB_FT
[NS_DeliveryAttending1_OBGYN_ALL_OB_FT:[id number]],[NS_DeliveryAssist1_OBGYN_ALL_OB_FT:[id number]],[NS_DeliveryAssist2_OBGYN_ALL_OB_FT:[id number]]

## 2024-03-15 NOTE — OB PROVIDER LABOR PROGRESS NOTE - NS_SUBJECTIVE/OBJECTIVE_OBGYN_ALL_OB_FT
BACK NOTE from 1345    Patient seen and examined for cervical change, unchanged exam throughout the day; IUPC placed    Vital Signs Last 24 Hrs  T(C): 36.9 (15 Mar 2024 16:32), Max: 36.9 (14 Mar 2024 19:27)  T(F): 98.42 (15 Mar 2024 16:32), Max: 98.42 (14 Mar 2024 19:27)  HR: 86 (15 Mar 2024 16:35) (56 - 116)  BP: 130/78 (15 Mar 2024 16:22) (98/51 - 176/93)  BP(mean): --  RR: 16 (15 Mar 2024 14:46) (16 - 18)  SpO2: 91% (15 Mar 2024 16:35) (81% - 100%)
Pt evaluated at bedside for possible cervical balloon placement.    ICU Vital Signs Last 24 Hrs  T(C): 36.9 (14 Mar 2024 19:27), Max: 36.9 (14 Mar 2024 19:27)  T(F): 98.42 (14 Mar 2024 19:27), Max: 98.42 (14 Mar 2024 19:27)  HR: 105 (14 Mar 2024 22:23) (72 - 116)  BP: 135/84 (14 Mar 2024 19:17) (135/84 - 135/84)  SpO2: 96% (14 Mar 2024 22:23) (81% - 99%)
Pt evaluated for recurrent variables
S:  Pt seen and examined for cervical balloon placement. Cervical balloon placed in usual fashion.    O:  Vital Signs Last 24 Hrs  T(C): 36.9 (14 Mar 2024 23:31), Max: 36.9 (14 Mar 2024 19:27)  T(F): 98.42 (14 Mar 2024 19:27), Max: 98.42 (14 Mar 2024 19:27)  HR: 108 (15 Mar 2024 00:45) (72 - 116)  BP: 131/79 (15 Mar 2024 00:39) (120/76 - 176/93)  BP(mean): --  RR: --  SpO2: 99% (15 Mar 2024 00:42) (81% - 99%)
Pt evaluated at bedside for a cervical exam. Pt comfortable in bed with an epidural and the cervical balloon in place.    ICU Vital Signs Last 24 Hrs  T(C): 36.7 (15 Mar 2024 01:01), Max: 36.9 (14 Mar 2024 19:27)  T(F): 98.06 (15 Mar 2024 01:01), Max: 98.42 (14 Mar 2024 19:27)  HR: 87 (15 Mar 2024 05:47) (63 - 116)  BP: 115/69 (15 Mar 2024 05:36) (98/51 - 176/93)  RR: 18 (15 Mar 2024 01:01) (17 - 18)  SpO2: 96% (15 Mar 2024 05:47) (81% - 99%)
Patient seen and examined for cervical change, resting comfortably with epidural; AROM successful clear fluid noted    Vital Signs Last 24 Hrs  T(C): 36.9 (15 Mar 2024 11:06), Max: 36.9 (14 Mar 2024 19:27)  T(F): 98.42 (15 Mar 2024 11:06), Max: 98.42 (14 Mar 2024 19:27)  HR: 100 (15 Mar 2024 12:28) (63 - 116)  BP: 134/73 (15 Mar 2024 12:21) (98/51 - 176/93)  BP(mean): --  RR: 16 (15 Mar 2024 11:06) (16 - 18)  SpO2: 92% (15 Mar 2024 12:28) (81% - 99%)

## 2024-03-15 NOTE — OB PROVIDER LABOR PROGRESS NOTE - NSVAGINALEXAM_OBGYN_ALL_OB_DT
14-Mar-2024 22:00
15-Mar-2024 12:34
15-Mar-2024 15:40
15-Mar-2024 18:56
15-Mar-2024 00:52
15-Mar-2024 05:45

## 2024-03-15 NOTE — OB RN INTRAOPERATIVE NOTE - NS_DELIVERYASSIST1_OBGYN_ALL_OB_FT
Galindo Fall MD Burow's Advancement Flap Text: The defect edges were debeveled with a #15 scalpel blade.  Given the location of the defect and the proximity to free margins a Burow's advancement flap was deemed most appropriate.  Using a sterile surgical marker, the appropriate advancement flap was drawn incorporating the defect and placing the expected incisions within the relaxed skin tension lines where possible.    The area thus outlined was incised deep to adipose tissue with a #15 scalpel blade.  The skin margins were undermined to an appropriate distance in all directions utilizing iris scissors.

## 2024-03-16 LAB
ANISOCYTOSIS BLD QL: SLIGHT — SIGNIFICANT CHANGE UP
BASOPHILS # BLD AUTO: 0 K/UL — SIGNIFICANT CHANGE UP (ref 0–0.2)
BASOPHILS NFR BLD AUTO: 0 % — SIGNIFICANT CHANGE UP (ref 0–2)
BURR CELLS BLD QL SMEAR: PRESENT — SIGNIFICANT CHANGE UP
DACRYOCYTES BLD QL SMEAR: SLIGHT — SIGNIFICANT CHANGE UP
EOSINOPHIL # BLD AUTO: 0 K/UL — SIGNIFICANT CHANGE UP (ref 0–0.5)
EOSINOPHIL NFR BLD AUTO: 0 % — SIGNIFICANT CHANGE UP (ref 0–6)
HCT VFR BLD CALC: 33.7 % — LOW (ref 34.5–45)
HGB BLD-MCNC: 11.4 G/DL — LOW (ref 11.5–15.5)
LYMPHOCYTES # BLD AUTO: 0.92 K/UL — LOW (ref 1–3.3)
LYMPHOCYTES # BLD AUTO: 3.5 % — LOW (ref 13–44)
MANUAL SMEAR VERIFICATION: SIGNIFICANT CHANGE UP
MCHC RBC-ENTMCNC: 26.3 PG — LOW (ref 27–34)
MCHC RBC-ENTMCNC: 33.8 GM/DL — SIGNIFICANT CHANGE UP (ref 32–36)
MCV RBC AUTO: 77.8 FL — LOW (ref 80–100)
MICROCYTES BLD QL: SLIGHT — SIGNIFICANT CHANGE UP
MONOCYTES # BLD AUTO: 0.69 K/UL — SIGNIFICANT CHANGE UP (ref 0–0.9)
MONOCYTES NFR BLD AUTO: 2.6 % — SIGNIFICANT CHANGE UP (ref 2–14)
NEUTROPHILS # BLD AUTO: 24.76 K/UL — HIGH (ref 1.8–7.4)
NEUTROPHILS NFR BLD AUTO: 93 % — HIGH (ref 43–77)
NEUTS BAND # BLD: 0.9 % — SIGNIFICANT CHANGE UP (ref 0–8)
PLAT MORPH BLD: NORMAL — SIGNIFICANT CHANGE UP
PLATELET # BLD AUTO: 248 K/UL — SIGNIFICANT CHANGE UP (ref 150–400)
POIKILOCYTOSIS BLD QL AUTO: SIGNIFICANT CHANGE UP
POLYCHROMASIA BLD QL SMEAR: SLIGHT — SIGNIFICANT CHANGE UP
RBC # BLD: 4.33 M/UL — SIGNIFICANT CHANGE UP (ref 3.8–5.2)
RBC # FLD: 14.7 % — HIGH (ref 10.3–14.5)
RBC BLD AUTO: ABNORMAL
WBC # BLD: 26.37 K/UL — HIGH (ref 3.8–10.5)
WBC # FLD AUTO: 26.37 K/UL — HIGH (ref 3.8–10.5)

## 2024-03-16 RX ORDER — OXYCODONE HYDROCHLORIDE 5 MG/1
10 TABLET ORAL
Refills: 0 | Status: DISCONTINUED | OUTPATIENT
Start: 2024-03-15 | End: 2024-03-16

## 2024-03-16 RX ORDER — KETOROLAC TROMETHAMINE 30 MG/ML
30 SYRINGE (ML) INJECTION EVERY 6 HOURS
Refills: 0 | Status: DISCONTINUED | OUTPATIENT
Start: 2024-03-16 | End: 2024-03-18

## 2024-03-16 RX ORDER — DEXAMETHASONE 0.5 MG/5ML
4 ELIXIR ORAL EVERY 6 HOURS
Refills: 0 | Status: DISCONTINUED | OUTPATIENT
Start: 2024-03-15 | End: 2024-03-16

## 2024-03-16 RX ORDER — NALOXONE HYDROCHLORIDE 4 MG/.1ML
0.1 SPRAY NASAL
Refills: 0 | Status: DISCONTINUED | OUTPATIENT
Start: 2024-03-15 | End: 2024-03-16

## 2024-03-16 RX ORDER — HEPARIN SODIUM 5000 [USP'U]/ML
5000 INJECTION INTRAVENOUS; SUBCUTANEOUS EVERY 12 HOURS
Refills: 0 | Status: DISCONTINUED | OUTPATIENT
Start: 2024-03-16 | End: 2024-03-18

## 2024-03-16 RX ORDER — IBUPROFEN 200 MG
600 TABLET ORAL EVERY 6 HOURS
Refills: 0 | Status: DISCONTINUED | OUTPATIENT
Start: 2024-03-16 | End: 2024-03-18

## 2024-03-16 RX ORDER — MORPHINE SULFATE 50 MG/1
2 CAPSULE, EXTENDED RELEASE ORAL ONCE
Refills: 0 | Status: DISCONTINUED | OUTPATIENT
Start: 2024-03-15 | End: 2024-03-16

## 2024-03-16 RX ORDER — OXYCODONE HYDROCHLORIDE 5 MG/1
5 TABLET ORAL
Refills: 0 | Status: DISCONTINUED | OUTPATIENT
Start: 2024-03-15 | End: 2024-03-16

## 2024-03-16 RX ORDER — ONDANSETRON 8 MG/1
4 TABLET, FILM COATED ORAL EVERY 6 HOURS
Refills: 0 | Status: DISCONTINUED | OUTPATIENT
Start: 2024-03-15 | End: 2024-03-16

## 2024-03-16 RX ORDER — OXYCODONE HYDROCHLORIDE 5 MG/1
5 TABLET ORAL
Refills: 0 | Status: DISCONTINUED | OUTPATIENT
Start: 2024-03-16 | End: 2024-03-18

## 2024-03-16 RX ADMIN — Medication 30 MILLIGRAM(S): at 02:44

## 2024-03-16 RX ADMIN — PIPERACILLIN AND TAZOBACTAM 200 GRAM(S): 4; .5 INJECTION, POWDER, LYOPHILIZED, FOR SOLUTION INTRAVENOUS at 06:35

## 2024-03-16 RX ADMIN — Medication 975 MILLIGRAM(S): at 17:24

## 2024-03-16 RX ADMIN — Medication 975 MILLIGRAM(S): at 21:45

## 2024-03-16 RX ADMIN — PIPERACILLIN AND TAZOBACTAM 200 GRAM(S): 4; .5 INJECTION, POWDER, LYOPHILIZED, FOR SOLUTION INTRAVENOUS at 22:09

## 2024-03-16 RX ADMIN — OXYCODONE HYDROCHLORIDE 5 MILLIGRAM(S): 5 TABLET ORAL at 11:54

## 2024-03-16 RX ADMIN — Medication 975 MILLIGRAM(S): at 05:38

## 2024-03-16 RX ADMIN — Medication 30 MILLIGRAM(S): at 15:32

## 2024-03-16 RX ADMIN — OXYCODONE HYDROCHLORIDE 5 MILLIGRAM(S): 5 TABLET ORAL at 02:30

## 2024-03-16 RX ADMIN — Medication 975 MILLIGRAM(S): at 21:15

## 2024-03-16 RX ADMIN — HEPARIN SODIUM 5000 UNIT(S): 5000 INJECTION INTRAVENOUS; SUBCUTANEOUS at 06:35

## 2024-03-16 RX ADMIN — PIPERACILLIN AND TAZOBACTAM 200 GRAM(S): 4; .5 INJECTION, POWDER, LYOPHILIZED, FOR SOLUTION INTRAVENOUS at 14:31

## 2024-03-16 RX ADMIN — HEPARIN SODIUM 5000 UNIT(S): 5000 INJECTION INTRAVENOUS; SUBCUTANEOUS at 17:32

## 2024-03-16 RX ADMIN — Medication 975 MILLIGRAM(S): at 11:26

## 2024-03-16 RX ADMIN — Medication 975 MILLIGRAM(S): at 06:30

## 2024-03-16 RX ADMIN — OXYCODONE HYDROCHLORIDE 5 MILLIGRAM(S): 5 TABLET ORAL at 22:40

## 2024-03-16 RX ADMIN — SIMETHICONE 80 MILLIGRAM(S): 80 TABLET, CHEWABLE ORAL at 21:14

## 2024-03-16 RX ADMIN — MAGNESIUM HYDROXIDE 30 MILLILITER(S): 400 TABLET, CHEWABLE ORAL at 22:30

## 2024-03-16 RX ADMIN — Medication 975 MILLIGRAM(S): at 14:31

## 2024-03-16 RX ADMIN — OXYCODONE HYDROCHLORIDE 5 MILLIGRAM(S): 5 TABLET ORAL at 01:38

## 2024-03-16 RX ADMIN — Medication 975 MILLIGRAM(S): at 10:30

## 2024-03-16 RX ADMIN — Medication 30 MILLIGRAM(S): at 08:34

## 2024-03-16 RX ADMIN — Medication 30 MILLIGRAM(S): at 03:30

## 2024-03-16 RX ADMIN — Medication 30 MILLIGRAM(S): at 18:18

## 2024-03-16 RX ADMIN — OXYCODONE HYDROCHLORIDE 5 MILLIGRAM(S): 5 TABLET ORAL at 22:12

## 2024-03-16 RX ADMIN — Medication 30 MILLIGRAM(S): at 09:30

## 2024-03-17 ENCOUNTER — TRANSCRIPTION ENCOUNTER (OUTPATIENT)
Age: 29
End: 2024-03-17

## 2024-03-17 LAB
BASOPHILS # BLD AUTO: 0.07 K/UL — SIGNIFICANT CHANGE UP (ref 0–0.2)
BASOPHILS NFR BLD AUTO: 0.4 % — SIGNIFICANT CHANGE UP (ref 0–2)
EOSINOPHIL # BLD AUTO: 0.13 K/UL — SIGNIFICANT CHANGE UP (ref 0–0.5)
EOSINOPHIL NFR BLD AUTO: 0.8 % — SIGNIFICANT CHANGE UP (ref 0–6)
HCT VFR BLD CALC: 27.9 % — LOW (ref 34.5–45)
HGB BLD-MCNC: 9.2 G/DL — LOW (ref 11.5–15.5)
IMM GRANULOCYTES NFR BLD AUTO: 0.5 % — SIGNIFICANT CHANGE UP (ref 0–0.9)
LYMPHOCYTES # BLD AUTO: 14.8 % — SIGNIFICANT CHANGE UP (ref 13–44)
LYMPHOCYTES # BLD AUTO: 2.45 K/UL — SIGNIFICANT CHANGE UP (ref 1–3.3)
MCHC RBC-ENTMCNC: 26.4 PG — LOW (ref 27–34)
MCHC RBC-ENTMCNC: 33 GM/DL — SIGNIFICANT CHANGE UP (ref 32–36)
MCV RBC AUTO: 79.9 FL — LOW (ref 80–100)
MONOCYTES # BLD AUTO: 0.65 K/UL — SIGNIFICANT CHANGE UP (ref 0–0.9)
MONOCYTES NFR BLD AUTO: 3.9 % — SIGNIFICANT CHANGE UP (ref 2–14)
NEUTROPHILS # BLD AUTO: 13.14 K/UL — HIGH (ref 1.8–7.4)
NEUTROPHILS NFR BLD AUTO: 79.6 % — HIGH (ref 43–77)
NRBC # BLD: 0 /100 WBCS — SIGNIFICANT CHANGE UP (ref 0–0)
PLATELET # BLD AUTO: 211 K/UL — SIGNIFICANT CHANGE UP (ref 150–400)
RBC # BLD: 3.49 M/UL — LOW (ref 3.8–5.2)
RBC # FLD: 15.2 % — HIGH (ref 10.3–14.5)
WBC # BLD: 16.53 K/UL — HIGH (ref 3.8–10.5)
WBC # FLD AUTO: 16.53 K/UL — HIGH (ref 3.8–10.5)

## 2024-03-17 RX ORDER — OXYCODONE HYDROCHLORIDE 5 MG/1
1 TABLET ORAL
Qty: 10 | Refills: 0
Start: 2024-03-17

## 2024-03-17 RX ORDER — IBUPROFEN 200 MG
1 TABLET ORAL
Qty: 0 | Refills: 0 | DISCHARGE
Start: 2024-03-17

## 2024-03-17 RX ORDER — ACETAMINOPHEN 500 MG
3 TABLET ORAL
Qty: 0 | Refills: 0 | DISCHARGE
Start: 2024-03-17

## 2024-03-17 RX ADMIN — Medication 600 MILLIGRAM(S): at 18:20

## 2024-03-17 RX ADMIN — Medication 975 MILLIGRAM(S): at 02:50

## 2024-03-17 RX ADMIN — OXYCODONE HYDROCHLORIDE 5 MILLIGRAM(S): 5 TABLET ORAL at 03:41

## 2024-03-17 RX ADMIN — SIMETHICONE 80 MILLIGRAM(S): 80 TABLET, CHEWABLE ORAL at 10:18

## 2024-03-17 RX ADMIN — Medication 975 MILLIGRAM(S): at 15:40

## 2024-03-17 RX ADMIN — Medication 600 MILLIGRAM(S): at 12:13

## 2024-03-17 RX ADMIN — MAGNESIUM HYDROXIDE 30 MILLILITER(S): 400 TABLET, CHEWABLE ORAL at 10:18

## 2024-03-17 RX ADMIN — MAGNESIUM HYDROXIDE 30 MILLILITER(S): 400 TABLET, CHEWABLE ORAL at 23:15

## 2024-03-17 RX ADMIN — Medication 600 MILLIGRAM(S): at 17:50

## 2024-03-17 RX ADMIN — OXYCODONE HYDROCHLORIDE 5 MILLIGRAM(S): 5 TABLET ORAL at 02:50

## 2024-03-17 RX ADMIN — Medication 600 MILLIGRAM(S): at 23:16

## 2024-03-17 RX ADMIN — SIMETHICONE 80 MILLIGRAM(S): 80 TABLET, CHEWABLE ORAL at 02:52

## 2024-03-17 RX ADMIN — Medication 30 MILLIGRAM(S): at 00:31

## 2024-03-17 RX ADMIN — Medication 975 MILLIGRAM(S): at 15:06

## 2024-03-17 RX ADMIN — Medication 975 MILLIGRAM(S): at 09:04

## 2024-03-17 RX ADMIN — Medication 600 MILLIGRAM(S): at 23:55

## 2024-03-17 RX ADMIN — Medication 975 MILLIGRAM(S): at 09:30

## 2024-03-17 RX ADMIN — Medication 30 MILLIGRAM(S): at 05:40

## 2024-03-17 RX ADMIN — SIMETHICONE 80 MILLIGRAM(S): 80 TABLET, CHEWABLE ORAL at 23:16

## 2024-03-17 RX ADMIN — Medication 975 MILLIGRAM(S): at 21:00

## 2024-03-17 RX ADMIN — OXYCODONE HYDROCHLORIDE 5 MILLIGRAM(S): 5 TABLET ORAL at 10:18

## 2024-03-17 RX ADMIN — Medication 975 MILLIGRAM(S): at 20:28

## 2024-03-17 RX ADMIN — Medication 600 MILLIGRAM(S): at 12:46

## 2024-03-17 RX ADMIN — Medication 30 MILLIGRAM(S): at 00:20

## 2024-03-17 RX ADMIN — Medication 975 MILLIGRAM(S): at 03:38

## 2024-03-17 RX ADMIN — Medication 30 MILLIGRAM(S): at 05:21

## 2024-03-17 RX ADMIN — HEPARIN SODIUM 5000 UNIT(S): 5000 INJECTION INTRAVENOUS; SUBCUTANEOUS at 05:39

## 2024-03-17 RX ADMIN — OXYCODONE HYDROCHLORIDE 5 MILLIGRAM(S): 5 TABLET ORAL at 10:50

## 2024-03-17 RX ADMIN — HEPARIN SODIUM 5000 UNIT(S): 5000 INJECTION INTRAVENOUS; SUBCUTANEOUS at 17:50

## 2024-03-17 NOTE — DISCHARGE NOTE OB - MEDICATION SUMMARY - MEDICATIONS TO TAKE
I will START or STAY ON the medications listed below when I get home from the hospital:    ibuprofen 600 mg oral tablet  -- 1 tab(s) by mouth every 6 hours  -- Indication: For moderate pain    acetaminophen 325 mg oral tablet  -- 3 tab(s) by mouth every 6 hours as needed for  mild pain  -- Indication: For mild pain    oxyCODONE 5 mg oral tablet  -- 1 tab(s) by mouth every 3 hours as needed for Moderate to Severe Pain (4-10) MDD: 6  -- Indication: For Severe pain

## 2024-03-17 NOTE — DISCHARGE NOTE OB - CARE PLAN
Principal Discharge DX:	Single delivery by   Assessment and plan of treatment:	follow up in 2 weeks for incision check   1

## 2024-03-17 NOTE — DISCHARGE NOTE OB - CARE PROVIDER_API CALL
Serena Dunn  Obstetrics and Gynecology  [address] [facility] [address]  [place] [address]  Phone: [phone]  Fax: [phone]  Follow Up Time:

## 2024-03-17 NOTE — DISCHARGE NOTE OB - MEDICATION SUMMARY - MEDICATIONS TO STOP TAKING
I will STOP taking the medications listed below when I get home from the hospital:    Cipro 500 mg oral tablet  -- 1 tab(s) by mouth 2 times a day   -- Avoid prolonged or excessive exposure to direct and/or artificial sunlight while taking this medication.  Check with your doctor before becoming pregnant.  Do not take dairy products, antacids, or iron preparations within one hour of this medication.  Finish all this medication unless otherwise directed by prescriber.  Medication should be taken with plenty of water.    Tussin 100 mg/5 mL oral liquid  -- 10 milliliter(s) by mouth every 6 hours as needed for  cough    dicyclomine 20 mg oral tablet  -- 1 tab(s) by mouth 4 times a day   -- May cause drowsiness.  Alcohol may intensify this effect.  Use care when operating dangerous machinery.    Tamiflu 75 mg oral capsule  -- 1 cap(s) by mouth 2 times a day    Protonix 40 mg oral delayed release tablet  -- 1 tab(s) by mouth once a day   -- It is very important that you take or use this exactly as directed.  Do not skip doses or discontinue unless directed by your doctor.  Obtain medical advice before taking any non-prescription drugs as some may affect the action of this medication.  Swallow whole.  Do not crush.    Nitazoxanide 500 mg oral tablet  -- 1 tab(s) by mouth 2 times a day   -- Take with food or milk.

## 2024-03-17 NOTE — DISCHARGE NOTE OB - PATIENT PORTAL LINK FT
You can access the FollowMyHealth Patient Portal offered by [facility] by registering at the following website: [email or web address]. By joining [employer]’s FollowMyHealth portal, you will also be able to view your health information using other applications (apps) compatible with our system.

## 2024-03-18 VITALS
SYSTOLIC BLOOD PRESSURE: 114 MMHG | TEMPERATURE: 98 F | DIASTOLIC BLOOD PRESSURE: 72 MMHG | HEART RATE: 87 BPM | RESPIRATION RATE: 18 BRPM | OXYGEN SATURATION: 97 %

## 2024-03-18 PROBLEM — O36.5990 IUGR, ANTENATAL: Status: ACTIVE | Noted: 2024-03-18

## 2024-03-18 PROBLEM — Z3A.38 38 WEEKS GESTATION OF PREGNANCY: Status: ACTIVE | Noted: 2024-03-18

## 2024-03-18 PROCEDURE — 88307 TISSUE EXAM BY PATHOLOGIST: CPT

## 2024-03-18 PROCEDURE — 86901 BLOOD TYPING SEROLOGIC RH(D): CPT

## 2024-03-18 PROCEDURE — 59050 FETAL MONITOR W/REPORT: CPT

## 2024-03-18 PROCEDURE — 86850 RBC ANTIBODY SCREEN: CPT

## 2024-03-18 PROCEDURE — 85025 COMPLETE CBC W/AUTO DIFF WBC: CPT

## 2024-03-18 PROCEDURE — 86780 TREPONEMA PALLIDUM: CPT

## 2024-03-18 PROCEDURE — 36415 COLL VENOUS BLD VENIPUNCTURE: CPT

## 2024-03-18 PROCEDURE — 59025 FETAL NON-STRESS TEST: CPT

## 2024-03-18 PROCEDURE — 86900 BLOOD TYPING SEROLOGIC ABO: CPT

## 2024-03-18 RX ORDER — POLYETHYLENE GLYCOL 3350 17 G/17G
17 POWDER, FOR SOLUTION ORAL ONCE
Refills: 0 | Status: COMPLETED | OUTPATIENT
Start: 2024-03-18 | End: 2024-03-18

## 2024-03-18 RX ORDER — POLYETHYLENE GLYCOL 3350 17 G/17G
17 POWDER, FOR SOLUTION ORAL DAILY
Refills: 0 | Status: DISCONTINUED | OUTPATIENT
Start: 2024-03-18 | End: 2024-03-18

## 2024-03-18 RX ADMIN — Medication 975 MILLIGRAM(S): at 02:20

## 2024-03-18 RX ADMIN — Medication 975 MILLIGRAM(S): at 14:24

## 2024-03-18 RX ADMIN — Medication 600 MILLIGRAM(S): at 06:14

## 2024-03-18 RX ADMIN — Medication 600 MILLIGRAM(S): at 12:36

## 2024-03-18 RX ADMIN — Medication 975 MILLIGRAM(S): at 15:15

## 2024-03-18 RX ADMIN — POLYETHYLENE GLYCOL 3350 17 GRAM(S): 17 POWDER, FOR SOLUTION ORAL at 01:10

## 2024-03-18 RX ADMIN — Medication 975 MILLIGRAM(S): at 10:07

## 2024-03-18 RX ADMIN — HEPARIN SODIUM 5000 UNIT(S): 5000 INJECTION INTRAVENOUS; SUBCUTANEOUS at 05:27

## 2024-03-18 RX ADMIN — Medication 975 MILLIGRAM(S): at 11:00

## 2024-03-18 RX ADMIN — Medication 600 MILLIGRAM(S): at 13:30

## 2024-03-18 RX ADMIN — OXYCODONE HYDROCHLORIDE 5 MILLIGRAM(S): 5 TABLET ORAL at 01:10

## 2024-03-18 RX ADMIN — POLYETHYLENE GLYCOL 3350 17 GRAM(S): 17 POWDER, FOR SOLUTION ORAL at 12:36

## 2024-03-18 RX ADMIN — Medication 975 MILLIGRAM(S): at 02:56

## 2024-03-18 RX ADMIN — OXYCODONE HYDROCHLORIDE 5 MILLIGRAM(S): 5 TABLET ORAL at 01:42

## 2024-03-18 RX ADMIN — Medication 600 MILLIGRAM(S): at 05:26

## 2024-03-18 NOTE — PROGRESS NOTE ADULT - ATTENDING COMMENTS
A/P: 29yo POD#2 s/p LTCS.  Patient is stable and doing well post-operatively.    Pt seen and examined by me. doing well. d/c planning.
I have seen and examined this patient.  I agree with the above assessment and plan.  She is stable at this time.  Continue with the above care.  Sherron Haynes MD
POD# s/p pLTCS. VSS. Doing well postpartum  -fundus firm, midline  -incision c/d/i  -lochia WNL  -pain well controlled  -passing flatus  -cleared for d/c home    R Ashly FISCHER

## 2024-03-18 NOTE — PROGRESS NOTE ADULT - SUBJECTIVE AND OBJECTIVE BOX
OB Postpartum Note:  Delivery, POD#3    S: 27yo POD#3 s/p LTCS. The patient feels well.  Pain is well controlled. She is tolerating a regular diet and passing flatus. She is voiding spontaneously, and ambulating without difficulty. Denies CP/SOB. Denies lightheadedness/dizziness. Denies N/V.    O:  Vitals:  Vital Signs Last 24 Hrs  T(C): 36.8 (17 Mar 2024 21:26), Max: 36.8 (17 Mar 2024 13:25)  T(F): 98.3 (17 Mar 2024 21:26), Max: 98.3 (17 Mar 2024 21:26)  HR: 98 (17 Mar 2024 21:) (98 - 101)  BP: 116/80 (17 Mar 2024 21:) (114/77 - 116/80)  BP(mean): --  RR: 18 (17 Mar 2024 21:26) (18 - 18)  SpO2: 100% (17 Mar 2024 21:) (97% - 100%)    Parameters below as of 17 Mar 2024 21:26  Patient On (Oxygen Delivery Method): room air        MEDICATIONS  (STANDING):  acetaminophen     Tablet .. 975 milliGRAM(s) Oral <User Schedule>  diphtheria/tetanus/pertussis (acellular) Vaccine (Adacel) 0.5 milliLiter(s) IntraMuscular once  heparin   Injectable 5000 Unit(s) SubCutaneous every 12 hours  ibuprofen  Tablet. 600 milliGRAM(s) Oral every 6 hours  influenza   Vaccine 0.5 milliLiter(s) IntraMuscular once  lactated ringers Bolus 1000 milliLiter(s) IV Bolus once  lactated ringers Bolus 500 milliLiter(s) IV Bolus once  lactated ringers. 1000 milliLiter(s) (125 mL/Hr) IV Continuous <Continuous>  oxytocin Infusion 333.333 milliUNIT(s)/Min (1000 mL/Hr) IV Continuous <Continuous>  oxytocin Infusion 333.333 milliUNIT(s)/Min (1000 mL/Hr) IV Continuous <Continuous>  oxytocin Infusion. 4 milliUNIT(s)/Min (4 mL/Hr) IV Continuous <Continuous>  oxytocin Infusion. 4 milliUNIT(s)/Min (4 mL/Hr) IV Continuous <Continuous>  polyethylene glycol 3350 17 Gram(s) Oral daily    MEDICATIONS  (PRN):  diphenhydrAMINE 25 milliGRAM(s) Oral every 6 hours PRN Pruritus  lanolin Ointment 1 Application(s) Topical every 6 hours PRN Sore Nipples  magnesium hydroxide Suspension 30 milliLiter(s) Oral two times a day PRN Constipation  oxyCODONE    IR 5 milliGRAM(s) Oral once PRN Moderate to Severe Pain (4-10)  oxyCODONE    IR 5 milliGRAM(s) Oral every 3 hours PRN Moderate to Severe Pain (4-10)  simethicone 80 milliGRAM(s) Chew every 4 hours PRN Gas      LABS:  Blood type: B Positive  Rubella IgG: RPR: Negative                          9.2<L>   16.53<H> >-----------< 211    (  @ 08:41 )             27.9<L>                        11.4<L>   26.37<H> >-----------< 248    (  @ 06:49 )             33.7<L>                  Physical exam:  Gen: NAD  Abdomen: Soft, nontender, no distension , firm uterine fundus at umbilicus.  Incision: Clean, dry, and intact   Pelvic: Normal lochia noted  Ext: No calf tenderness    
OB Progress Note: LTCS, POD#2    S: 29yo on POD#2 s/p LTCS. Pain is well controlled. She is tolerating a regular diet and passing flatus. She is voiding spontaneously, and ambulating without difficulty. Denies chest pain, shortness of breath. Denies headaches, blurry vision, epigastric pain, weakness. Denies nausea/vomiting.    O:  Vitals:  Vital Signs Last 24 Hrs  T(C): 36.6 (16 Mar 2024 20:23), Max: 37.1 (16 Mar 2024 17:27)  T(F): 97.8 (16 Mar 2024 20:23), Max: 98.7 (16 Mar 2024 17:27)  HR: 95 (16 Mar 2024 20:23) (84 - 95)  BP: 115/75 (16 Mar 2024 20:23) (105/72 - 115/75)  BP(mean): --  RR: 18 (16 Mar 2024 20:23) (17 - 18)  SpO2: 97% (16 Mar 2024 20:23) (96% - 99%)    Parameters below as of 16 Mar 2024 20:23  Patient On (Oxygen Delivery Method): room air        MEDICATIONS  (STANDING):  acetaminophen     Tablet .. 975 milliGRAM(s) Oral <User Schedule>  diphtheria/tetanus/pertussis (acellular) Vaccine (Adacel) 0.5 milliLiter(s) IntraMuscular once  heparin   Injectable 5000 Unit(s) SubCutaneous every 12 hours  ibuprofen  Tablet. 600 milliGRAM(s) Oral every 6 hours  influenza   Vaccine 0.5 milliLiter(s) IntraMuscular once  ketorolac   Injectable 30 milliGRAM(s) IV Push every 6 hours  lactated ringers Bolus 1000 milliLiter(s) IV Bolus once  lactated ringers Bolus 500 milliLiter(s) IV Bolus once  lactated ringers. 1000 milliLiter(s) (125 mL/Hr) IV Continuous <Continuous>  oxytocin Infusion 333.333 milliUNIT(s)/Min (1000 mL/Hr) IV Continuous <Continuous>  oxytocin Infusion 333.333 milliUNIT(s)/Min (1000 mL/Hr) IV Continuous <Continuous>  oxytocin Infusion. 4 milliUNIT(s)/Min (4 mL/Hr) IV Continuous <Continuous>  oxytocin Infusion. 4 milliUNIT(s)/Min (4 mL/Hr) IV Continuous <Continuous>  piperacillin/tazobactam IVPB.. 4.5 Gram(s) IV Intermittent every 8 hours      MEDICATIONS  (PRN):  diphenhydrAMINE 25 milliGRAM(s) Oral every 6 hours PRN Pruritus  lanolin Ointment 1 Application(s) Topical every 6 hours PRN Sore Nipples  magnesium hydroxide Suspension 30 milliLiter(s) Oral two times a day PRN Constipation  oxyCODONE    IR 5 milliGRAM(s) Oral once PRN Moderate to Severe Pain (4-10)  oxyCODONE    IR 5 milliGRAM(s) Oral every 3 hours PRN Moderate to Severe Pain (4-10)  simethicone 80 milliGRAM(s) Chew every 4 hours PRN Gas      Labs:  Blood type: B Positive  Rubella IgG: RPR: Negative                          11.4<L>   26.37<H> >-----------< 248    ( 03-16 @ 06:49 )             33.7<L>                        12.4   11.87<H> >-----------< 275    ( 03-14 @ 19:30 )             36.6                  PE:  General: NAD  Abdomen: Soft, appropriately tender, incision c/d/i.  Extremities: No erythema, no pitting edema  
POST OP DAY  1  s/p   SECTION    SUBJECTIVE:  I'm ok.    PAIN SCALE SCORE: [x] Refer to charted pain scores    THERAPY:  [  ] Spinal morphine   [x  ] Epidural morphine   [  ] IV PCA Hydromorphone 1 mg/ml    OBJECTIVE:  Comfortable Appearing    SEDATION SCORE:	  [ x ] Alert	    [  ] Drowsy        [  ] Arousable	[  ] Asleep	[  ] Unresponsive    Side Effects:	  [ x ] None	     [  ] Nausea        [  ] Pruritus        [  ] Weakness   [  ] Numbness        ASSESSMENT/ PLAN   [   ] Discontinue         [  ] Continue    [ x ] Change to prn Analgesics as per primary service.    DOCUMENTATION & VERIFICATION OF CURRENT MEDS [ x ] Done    COMMENTS: No Headache.  
OB Progress Note:  Delivery, POD#1    S: 29yo POD#1 s/p LTCS . Her pain is well controlled. She is tolerating a regular diet and passing flatus. Denies N/V. Denies CP/SOB/lightheadedness/dizziness.   She is ambulating without difficulty.   Voiding spontaneously.     O:   Vital Signs Last 24 Hrs  T(C): 36.7 (16 Mar 2024 01:17), Max: 37.5 (15 Mar 2024 21:20)  T(F): 98.1 (16 Mar 2024 01:17), Max: 99.5 (15 Mar 2024 21:20)  HR: 80 (16 Mar 2024 01:) (56 - 130)  BP: 126/81 (16 Mar 2024 01:) (100/54 - 149/67)  BP(mean): 91 (15 Mar 2024 23:35) (81 - 93)  RR: 20 (16 Mar 2024 01:) (16 - 20)  SpO2: 97% (16 Mar 2024 01:) (78% - 100%)    Parameters below as of 15 Mar 2024 21:20  Patient On (Oxygen Delivery Method): room air        Labs:  Blood type: B Positive  Rubella IgG: RPR: Negative                          12.4   11.87<H> >-----------< 275    ( 14 @ 19:30 )             36.6                  PE:  General: NAD  Abdomen: Mildly distended, appropriately tender, incision c/d/i.  Extremities: No erythema, no pitting edema

## 2024-03-18 NOTE — PROGRESS NOTE ADULT - ASSESSMENT
A/P: 29yo POD#2 s/p LTCS.  Patient is stable and doing well post-operatively.      #postpartum care  - Continue regular diet.  - Increase ambulation.  - HSQ, venodynes for DVT prophylaxis  - Continue motrin, tylenol, oxycodone PRN for pain control  - Discharge planning per private attending    Santa Dixon, PGY1
A/P: 29yo POD#1 s/p LTCS.  Patient is stable and doing well post-operatively.      #Postop from LTCS  - Continue regular diet.  - Increase ambulation.  - Continue motrin, tylenol, oxycodone PRN for pain control.  - F/u AM CBC    Rena Muhammad MD PGY1
A/P: 29yo POD#3 s/p LTCS.  Patient is stable and is doing well post-operatively.    #Postop from LTCS  - Continue motrin, tylenol, oxycodone PRN for pain control.  - Increase ambulation  - Continue regular diet  - Discharge planning    Rena Muhammad MD PGY1

## 2024-03-20 ENCOUNTER — APPOINTMENT (OUTPATIENT)
Dept: OBGYN | Facility: CLINIC | Age: 29
End: 2024-03-20

## 2024-03-21 ENCOUNTER — APPOINTMENT (OUTPATIENT)
Age: 29
End: 2024-03-21
Payer: COMMERCIAL

## 2024-03-21 PROCEDURE — S9443: CPT | Mod: 95

## 2024-03-22 ENCOUNTER — APPOINTMENT (OUTPATIENT)
Age: 29
End: 2024-03-22

## 2024-03-22 ENCOUNTER — APPOINTMENT (OUTPATIENT)
Age: 29
End: 2024-03-22
Payer: COMMERCIAL

## 2024-03-22 PROCEDURE — S9443: CPT | Mod: 95

## 2024-03-26 ENCOUNTER — APPOINTMENT (OUTPATIENT)
Dept: OBGYN | Facility: CLINIC | Age: 29
End: 2024-03-26
Payer: COMMERCIAL

## 2024-03-26 VITALS — DIASTOLIC BLOOD PRESSURE: 83 MMHG | WEIGHT: 173 LBS | BODY MASS INDEX: 27.92 KG/M2 | SYSTOLIC BLOOD PRESSURE: 113 MMHG

## 2024-03-26 PROCEDURE — 0503F POSTPARTUM CARE VISIT: CPT

## 2024-04-02 RX ORDER — ASCORBIC ACID, CHOLECALCIFEROL, .ALPHA.-TOCOPHEROL ACETATE, DL-, PYRIDOXINE, FOLIC ACID, CYANOCOBALAMIN, CALCIUM, FERROUS FUMARATE, MAGNESIUM, DOCONEXENT 85; 200; 10; 25; 1; 12; 140; 27; 45; 300 [IU]/1; [IU]/1; [IU]/1; [IU]/1; MG/1; UG/1; MG/1; MG/1; MG/1; MG/1
27-0.6-0.4-3 CAPSULE, GELATIN COATED ORAL
Qty: 90 | Refills: 3 | Status: ACTIVE | COMMUNITY
Start: 2024-03-26 | End: 1900-01-01

## 2024-04-22 ENCOUNTER — NON-APPOINTMENT (OUTPATIENT)
Age: 29
End: 2024-04-22

## 2024-04-23 ENCOUNTER — APPOINTMENT (OUTPATIENT)
Dept: OBGYN | Facility: CLINIC | Age: 29
End: 2024-04-23
Payer: COMMERCIAL

## 2024-04-23 ENCOUNTER — TRANSCRIPTION ENCOUNTER (OUTPATIENT)
Age: 29
End: 2024-04-23

## 2024-04-23 VITALS
WEIGHT: 175 LBS | SYSTOLIC BLOOD PRESSURE: 114 MMHG | TEMPERATURE: 98.5 F | DIASTOLIC BLOOD PRESSURE: 74 MMHG | BODY MASS INDEX: 28.25 KG/M2

## 2024-04-23 DIAGNOSIS — N71.9 INFLAMMATORY DISEASE OF UTERUS, UNSPECIFIED: ICD-10-CM

## 2024-04-23 PROCEDURE — 0503F POSTPARTUM CARE VISIT: CPT

## 2024-04-23 RX ORDER — AMOXICILLIN AND CLAVULANATE POTASSIUM 875; 125 MG/1; MG/1
875-125 TABLET, COATED ORAL
Qty: 14 | Refills: 0 | Status: ACTIVE | COMMUNITY
Start: 2024-04-23 | End: 1900-01-01

## 2024-04-25 ENCOUNTER — APPOINTMENT (OUTPATIENT)
Dept: OBGYN | Facility: CLINIC | Age: 29
End: 2024-04-25
Payer: COMMERCIAL

## 2024-04-25 VITALS — DIASTOLIC BLOOD PRESSURE: 81 MMHG | SYSTOLIC BLOOD PRESSURE: 113 MMHG

## 2024-04-25 DIAGNOSIS — N76.0 ACUTE VAGINITIS: ICD-10-CM

## 2024-04-25 DIAGNOSIS — B96.89 ACUTE VAGINITIS: ICD-10-CM

## 2024-04-25 LAB
ALBUMIN SERPL ELPH-MCNC: 4.5 G/DL
ALP BLD-CCNC: 94 U/L
ALT SERPL-CCNC: 15 U/L
ANION GAP SERPL CALC-SCNC: 16 MMOL/L
AST SERPL-CCNC: 16 U/L
BASOPHILS # BLD AUTO: 0.08 K/UL
BASOPHILS NFR BLD AUTO: 0.9 %
BILIRUB SERPL-MCNC: 0.6 MG/DL
BUN SERPL-MCNC: 14 MG/DL
BV BACTERIA RRNA VAG QL NAA+PROBE: DETECTED
C GLABRATA RNA VAG QL NAA+PROBE: NOT DETECTED
CALCIUM SERPL-MCNC: 10.1 MG/DL
CANDIDA RRNA VAG QL PROBE: NOT DETECTED
CHLORIDE SERPL-SCNC: 101 MMOL/L
CO2 SERPL-SCNC: 26 MMOL/L
CREAT SERPL-MCNC: 0.73 MG/DL
EGFR: 115 ML/MIN/1.73M2
EOSINOPHIL # BLD AUTO: 0.11 K/UL
EOSINOPHIL NFR BLD AUTO: 1.3 %
GLUCOSE SERPL-MCNC: 47 MG/DL
HCT VFR BLD CALC: 38 %
HGB BLD-MCNC: 11.8 G/DL
IMM GRANULOCYTES NFR BLD AUTO: 0.4 %
LYMPHOCYTES # BLD AUTO: 2.25 K/UL
LYMPHOCYTES NFR BLD AUTO: 26.3 %
MAN DIFF?: NORMAL
MCHC RBC-ENTMCNC: 25.1 PG
MCHC RBC-ENTMCNC: 31.1 GM/DL
MCV RBC AUTO: 80.9 FL
MONOCYTES # BLD AUTO: 0.51 K/UL
MONOCYTES NFR BLD AUTO: 6 %
NEUTROPHILS # BLD AUTO: 5.57 K/UL
NEUTROPHILS NFR BLD AUTO: 65.1 %
PLATELET # BLD AUTO: 325 K/UL
POTASSIUM SERPL-SCNC: 4.2 MMOL/L
PROT SERPL-MCNC: 8 G/DL
RBC # BLD: 4.7 M/UL
RBC # FLD: 14 %
SODIUM SERPL-SCNC: 143 MMOL/L
T VAGINALIS RRNA SPEC QL NAA+PROBE: NOT DETECTED
WBC # FLD AUTO: 8.55 K/UL

## 2024-04-25 PROCEDURE — 0503F POSTPARTUM CARE VISIT: CPT

## 2024-04-25 RX ORDER — METRONIDAZOLE 7.5 MG/G
0.75 GEL VAGINAL
Qty: 1 | Refills: 0 | Status: ACTIVE | COMMUNITY
Start: 2024-04-25 | End: 1900-01-01

## 2024-04-25 NOTE — HISTORY OF PRESENT ILLNESS
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    Pt is 5wk PPA s/p . She was seen (2024) for suspected endometritis. Was started on Augmentin (BID). Today she reports pain is the same. She is unable to carry her  due to this pelvic pain. She has been taking Tylenol for pain (BID). Pt denies any sxs of a fever.  Positive for BV, CBC nml. WBC: 8.5  Meds: Augmentin (BID)

## 2024-04-25 NOTE — PLAN
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   Suspected endometritis: C/w Augmentin  BV: Rx given for Metrogel   RTO in 1wk for follow-up or PRN.

## 2024-05-01 ENCOUNTER — APPOINTMENT (OUTPATIENT)
Dept: AFTER HOURS CARE | Facility: EMERGENCY ROOM | Age: 29
End: 2024-05-01
Payer: COMMERCIAL

## 2024-05-01 ENCOUNTER — APPOINTMENT (OUTPATIENT)
Age: 29
End: 2024-05-01

## 2024-05-01 PROCEDURE — 99204 OFFICE O/P NEW MOD 45 MIN: CPT

## 2024-05-01 NOTE — HISTORY OF PRESENT ILLNESS
[Home] : at home, [unfilled] , at the time of the visit. [Verbal consent obtained from patient] : the patient, [unfilled] [FreeTextEntry8] : Subjective: The patient is a 28-year-old female who is currently seven weeks postpartum. She presents with questions regarding breastfeeding and the use of medications. She reports experiencing sinus pressure, aches, and mild throat pain attributed to allergies. She completed a prescribed medication course for these symptoms ten days ago, with today marking the last day. She inquires about the safety of Tylenol Sinus, which she normally takes, during breastfeeding. She denies having fevers, nausea, vomiting, or other significant symptoms.    ROS as above otherwise neg  Physical Exam  General: Awake, alert and oriented. No acute distress. Well-developed, hydrated, and nourished. Appears stated age. HEENT: NCAT, EOMI, neck with normal ROM, no visible masses Cardiopulmonary: The chest is normal in appearance, RR wnl, no audible wheezes, no signs of respiratory distress Musculoskeletal: sitting upright with normal movement Psychiatric: mood, affect, and thought content normal and linear Neurological: The patient is awake, AAOx3 with normal speech. Thought process is intact. Extremities: atraumatic in appearance, moving upper extremities normally Skin: Skin is dry and intact without rashes or lesions.

## 2024-05-01 NOTE — PLAN
[FreeTextEntry1] : Assessment: The patient is a 28-year-old female who is currently seven weeks postpartum, presenting with sinus pressure and mild throat pain likely related to allergies. She has completed a recent course of prescribed medication and expresses concern about the safety of continuing Tylenol Sinus while breastfeeding. No other systemic symptoms such as fever, nausea, or vomiting are reported, suggesting the absence of a more severe infection.  Plan:  Medication Management: Prescribe Afrin nasal spray for immediate relief, to be used for a maximum of three days to avoid rebound congestion. Recommend continued use of Flonase as a long-term management strategy for sinus pressure and allergies. Confirm that Tylenol Sinus is safe to take during breastfeeding for managing sinus and pain symptoms. Follow-up and Monitoring: Schedule a follow-up appointment with the primary care provider or OB-GYN within two weeks to reassess symptom management and discuss any ongoing concerns about medications during breastfeeding. Encourage the patient to monitor her symptoms and report any worsening conditions, such as increased pain, development of fever, or new symptoms.

## 2024-05-03 ENCOUNTER — APPOINTMENT (OUTPATIENT)
Dept: OBGYN | Facility: CLINIC | Age: 29
End: 2024-05-03

## 2024-05-03 DIAGNOSIS — R10.2 PELVIC AND PERINEAL PAIN: ICD-10-CM

## 2024-05-08 LAB
BACTERIA SPEC CULT: ABNORMAL
CYTOLOGY CVX/VAG DOC THIN PREP: NORMAL

## 2024-05-13 ENCOUNTER — NON-APPOINTMENT (OUTPATIENT)
Age: 29
End: 2024-05-13

## 2024-05-13 ENCOUNTER — APPOINTMENT (OUTPATIENT)
Dept: ULTRASOUND IMAGING | Facility: CLINIC | Age: 29
End: 2024-05-13
Payer: COMMERCIAL

## 2024-05-13 DIAGNOSIS — N92.0 EXCESSIVE AND FREQUENT MENSTRUATION WITH REGULAR CYCLE: ICD-10-CM

## 2024-05-13 PROCEDURE — 76830 TRANSVAGINAL US NON-OB: CPT

## 2024-05-14 ENCOUNTER — APPOINTMENT (OUTPATIENT)
Dept: OBGYN | Facility: CLINIC | Age: 29
End: 2024-05-14
Payer: COMMERCIAL

## 2024-05-14 VITALS — DIASTOLIC BLOOD PRESSURE: 72 MMHG | WEIGHT: 177 LBS | BODY MASS INDEX: 28.57 KG/M2 | SYSTOLIC BLOOD PRESSURE: 110 MMHG

## 2024-05-14 DIAGNOSIS — N76.4 ABSCESS OF VULVA: ICD-10-CM

## 2024-05-14 PROCEDURE — 99214 OFFICE O/P EST MOD 30 MIN: CPT | Mod: 25

## 2024-05-14 RX ORDER — CLINDAMYCIN HYDROCHLORIDE 300 MG/1
300 CAPSULE ORAL
Qty: 21 | Refills: 0 | Status: ACTIVE | COMMUNITY
Start: 2024-05-14 | End: 1900-01-01

## 2024-05-16 NOTE — PHYSICAL EXAM
[Chaperone Present] : A chaperone was present in the examining room during all aspects of the physical examination [09571] : A chaperone was present during the pelvic exam. [Labia Majora] : normal [Labia Minora] : normal [Normal] : normal [Uterine Adnexae] : normal [FreeTextEntry2] : Miguel Cox [FreeTextEntry1] : 3 mons pubis boils measuring approximately 0.5 cm each. drainage cultured

## 2024-05-16 NOTE — PLAN
[FreeTextEntry1] : #vulvar boils -culture taken -Rx sent for Clindamycin -advised warm compresses, Sitz baths, and exfoliation -call back with cx results  #menorrhagia normal tvus likely heavier menses due to first period after delivery  RTO MINESH Limon MD

## 2024-05-16 NOTE — HISTORY OF PRESENT ILLNESS
[FreeTextEntry1] : 05/14/2024. 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  Pt reports heavy vaginal bleeding and clots with menses that began 5 days ago, now reduced to a moderate flow. She also c/o painful cramping with the menses onset. This is her first period since delivery. She has a h/o heavy menses before pregnancy but denies saturating more than 1 pad an hour.  S/p c/s on 3/15/24 due to IUGR.  TVUS done 5/13/24 - normal uterus, b/l ovaries normal, unremarkable exam.  Pt reports of puss-filled and inflamed boils in her mons and R inguinal area which occur only when she doesn't wax or remove hair from the area. Culture from 4/24/24 showed staphylococcus epidermidis. She applies topical mupirocin but is still uncomfortable especially when walking.

## 2024-06-03 ENCOUNTER — NON-APPOINTMENT (OUTPATIENT)
Age: 29
End: 2024-06-03

## 2024-07-05 NOTE — ED ADULT TRIAGE NOTE - HEIGHT IN INCHES
Subjective   Hien Arenas is a 18 y.o. female who presents for OTHER (Pt here with mom Suad Arenas/ lt ear pain ).  Today she is accompanied by accompanied by mother.     HPI  Root canal last week following dental abscess L back molar with some drained purulence. Now 2 days increasing L ear pain at jaw angle. No swimming, no cough/congestion/fever. Leaving for 10 days tomorrow for dance competition.    Objective   Temp 36.7 °C (98.1 °F)   Wt 50.6 kg (111 lb 8 oz)     Growth percentiles: No height on file for this encounter. 21 %ile (Z= -0.81) based on CDC (Girls, 2-20 Years) weight-for-age data using vitals from 7/5/2024.     Physical Exam  Alert in NAD  Tms clear. Canals clear  L back upper molar with redness and swelling of gum, mildly uncomfortable but not tender  RRR S1S2  CTAB  Abd soft NTND    Assessment/Plan   Diagnoses and all orders for this visit:  Dental abscess  -     amoxicillin (Amoxil) 500 mg capsule; Take 2 capsules (1,000 mg) by mouth once daily for 10 days.    May need emergence dentist while away. Mom understands this is not a curative treatment       6

## 2024-07-24 ENCOUNTER — NON-APPOINTMENT (OUTPATIENT)
Age: 29
End: 2024-07-24

## 2024-08-09 ENCOUNTER — NON-APPOINTMENT (OUTPATIENT)
Age: 29
End: 2024-08-09

## 2024-09-16 ENCOUNTER — NON-APPOINTMENT (OUTPATIENT)
Age: 29
End: 2024-09-16

## 2024-09-22 ENCOUNTER — NON-APPOINTMENT (OUTPATIENT)
Age: 29
End: 2024-09-22

## 2024-09-28 ENCOUNTER — NON-APPOINTMENT (OUTPATIENT)
Age: 29
End: 2024-09-28

## 2024-12-18 ENCOUNTER — NON-APPOINTMENT (OUTPATIENT)
Age: 29
End: 2024-12-18

## 2025-02-11 ENCOUNTER — NON-APPOINTMENT (OUTPATIENT)
Age: 30
End: 2025-02-11

## 2025-05-05 ENCOUNTER — NON-APPOINTMENT (OUTPATIENT)
Age: 30
End: 2025-05-05

## 2025-07-12 ENCOUNTER — NON-APPOINTMENT (OUTPATIENT)
Age: 30
End: 2025-07-12